# Patient Record
Sex: MALE | Race: WHITE | Employment: OTHER | URBAN - METROPOLITAN AREA
[De-identification: names, ages, dates, MRNs, and addresses within clinical notes are randomized per-mention and may not be internally consistent; named-entity substitution may affect disease eponyms.]

---

## 2017-05-17 RX ORDER — LISINOPRIL 10 MG/1
TABLET ORAL
Qty: 90 TABLET | Refills: 3 | Status: SHIPPED | OUTPATIENT
Start: 2017-05-17 | End: 2017-07-10

## 2017-05-17 NOTE — TELEPHONE ENCOUNTER
Hypertensive Medications; PROTOCOL FAILED. PLEASE ADVISE ON REFILL. THANKS.   Protocol Criteria:  · Appointment scheduled in the past 6 months or in the next 3 months  · BMP or CMP in the past 12 months  · Creatinine result < 2  Recent Visits       Provider

## 2017-07-10 ENCOUNTER — OFFICE VISIT (OUTPATIENT)
Dept: INTERNAL MEDICINE CLINIC | Facility: CLINIC | Age: 57
End: 2017-07-10

## 2017-07-10 ENCOUNTER — LAB ENCOUNTER (OUTPATIENT)
Dept: LAB | Facility: HOSPITAL | Age: 57
End: 2017-07-10
Attending: INTERNAL MEDICINE
Payer: COMMERCIAL

## 2017-07-10 VITALS
TEMPERATURE: 98 F | RESPIRATION RATE: 18 BRPM | DIASTOLIC BLOOD PRESSURE: 86 MMHG | HEART RATE: 76 BPM | WEIGHT: 220.63 LBS | HEIGHT: 69.5 IN | BODY MASS INDEX: 31.94 KG/M2 | SYSTOLIC BLOOD PRESSURE: 110 MMHG

## 2017-07-10 DIAGNOSIS — Z00.00 ROUTINE PHYSICAL EXAMINATION: Primary | ICD-10-CM

## 2017-07-10 DIAGNOSIS — Z00.00 ROUTINE PHYSICAL EXAMINATION: ICD-10-CM

## 2017-07-10 DIAGNOSIS — I10 ESSENTIAL HYPERTENSION WITH GOAL BLOOD PRESSURE LESS THAN 140/90: ICD-10-CM

## 2017-07-10 DIAGNOSIS — M25.512 LEFT SHOULDER PAIN, UNSPECIFIED CHRONICITY: ICD-10-CM

## 2017-07-10 DIAGNOSIS — R73.09 ELEVATED GLUCOSE: ICD-10-CM

## 2017-07-10 LAB
ALBUMIN SERPL BCP-MCNC: 3.9 G/DL (ref 3.5–4.8)
ALBUMIN/GLOB SERPL: 1 {RATIO} (ref 1–2)
ALP SERPL-CCNC: 51 U/L (ref 32–100)
ALT SERPL-CCNC: 41 U/L (ref 17–63)
ANION GAP SERPL CALC-SCNC: 9 MMOL/L (ref 0–18)
AST SERPL-CCNC: 25 U/L (ref 15–41)
BASOPHILS # BLD: 0.1 K/UL (ref 0–0.2)
BASOPHILS NFR BLD: 1 %
BILIRUB SERPL-MCNC: 0.9 MG/DL (ref 0.3–1.2)
BUN SERPL-MCNC: 16 MG/DL (ref 8–20)
BUN/CREAT SERPL: 12.2 (ref 10–20)
CALCIUM SERPL-MCNC: 9 MG/DL (ref 8.5–10.5)
CHLORIDE SERPL-SCNC: 103 MMOL/L (ref 95–110)
CHOLEST SERPL-MCNC: 191 MG/DL (ref 110–200)
CO2 SERPL-SCNC: 26 MMOL/L (ref 22–32)
CREAT SERPL-MCNC: 1.31 MG/DL (ref 0.5–1.5)
EOSINOPHIL # BLD: 0.2 K/UL (ref 0–0.7)
EOSINOPHIL NFR BLD: 3 %
ERYTHROCYTE [DISTWIDTH] IN BLOOD BY AUTOMATED COUNT: 13.5 % (ref 11–15)
GLOBULIN PLAS-MCNC: 3.8 G/DL (ref 2.5–3.7)
GLUCOSE SERPL-MCNC: 112 MG/DL (ref 70–99)
HBA1C MFR BLD: 5.8 % (ref 4–6)
HCT VFR BLD AUTO: 44.2 % (ref 41–52)
HDLC SERPL-MCNC: 44 MG/DL
HGB BLD-MCNC: 15 G/DL (ref 13.5–17.5)
LDLC SERPL CALC-MCNC: 131 MG/DL (ref 0–99)
LYMPHOCYTES # BLD: 1.3 K/UL (ref 1–4)
LYMPHOCYTES NFR BLD: 20 %
MCH RBC QN AUTO: 29.9 PG (ref 27–32)
MCHC RBC AUTO-ENTMCNC: 33.9 G/DL (ref 32–37)
MCV RBC AUTO: 88.3 FL (ref 80–100)
MONOCYTES # BLD: 0.6 K/UL (ref 0–1)
MONOCYTES NFR BLD: 9 %
NEUTROPHILS # BLD AUTO: 4.5 K/UL (ref 1.8–7.7)
NEUTROPHILS NFR BLD: 67 %
NONHDLC SERPL-MCNC: 147 MG/DL
OSMOLALITY UR CALC.SUM OF ELEC: 288 MOSM/KG (ref 275–295)
PLATELET # BLD AUTO: 257 K/UL (ref 140–400)
PMV BLD AUTO: 9 FL (ref 7.4–10.3)
POTASSIUM SERPL-SCNC: 4.3 MMOL/L (ref 3.3–5.1)
PROT SERPL-MCNC: 7.7 G/DL (ref 5.9–8.4)
PSA SERPL-MCNC: 1.9 NG/ML (ref 0–4)
RBC # BLD AUTO: 5.01 M/UL (ref 4.5–5.9)
SODIUM SERPL-SCNC: 138 MMOL/L (ref 136–144)
TRIGL SERPL-MCNC: 78 MG/DL (ref 1–149)
TSH SERPL-ACNC: 0.86 UIU/ML (ref 0.45–5.33)
WBC # BLD AUTO: 6.7 K/UL (ref 4–11)

## 2017-07-10 PROCEDURE — 83036 HEMOGLOBIN GLYCOSYLATED A1C: CPT

## 2017-07-10 PROCEDURE — 85025 COMPLETE CBC W/AUTO DIFF WBC: CPT

## 2017-07-10 PROCEDURE — 80061 LIPID PANEL: CPT

## 2017-07-10 PROCEDURE — 99396 PREV VISIT EST AGE 40-64: CPT | Performed by: INTERNAL MEDICINE

## 2017-07-10 PROCEDURE — 84443 ASSAY THYROID STIM HORMONE: CPT

## 2017-07-10 PROCEDURE — 80053 COMPREHEN METABOLIC PANEL: CPT

## 2017-07-10 PROCEDURE — 36415 COLL VENOUS BLD VENIPUNCTURE: CPT

## 2017-07-10 RX ORDER — LISINOPRIL 10 MG/1
10 TABLET ORAL
Qty: 90 TABLET | Refills: 3 | Status: SHIPPED | OUTPATIENT
Start: 2017-07-10 | End: 2018-08-08

## 2017-07-10 NOTE — PROGRESS NOTES
HPI:    Patient ID: Virginia Flowers is a 55year old male. HPI  Patient is here requesting general physical exam.  He has some active complaints today. Notes some left shoulder pain for the past 3 or 4 months.   May have started when he was doing a lot and fever. HENT: Negative for hearing loss. Eyes: Negative for visual disturbance. Respiratory: Negative for cough and shortness of breath. Cardiovascular: Negative for chest pain and palpitations.    Gastrointestinal: Negative for nausea, vomitin adenopathy. Right: No inguinal adenopathy present. Left: No inguinal adenopathy present. Neurological: He is alert. No cranial nerve deficit. Coordination normal.   Skin: Skin is warm and dry. No rash noted.    Psychiatric: He has a normal m

## 2018-08-09 NOTE — TELEPHONE ENCOUNTER
Hypertensive Medications: Protocol failed, please advise on prescription request.    Protocol Criteria:  · Appointment scheduled in the past 6 months or in the next 3 months  · BMP or CMP in the past 12 months  · Creatinine result < 2  Recent Outpatient Vi

## 2018-08-10 RX ORDER — LISINOPRIL 10 MG/1
TABLET ORAL
Qty: 90 TABLET | Refills: 3 | Status: SHIPPED | OUTPATIENT
Start: 2018-08-10 | End: 2018-08-30

## 2018-08-20 ENCOUNTER — OFFICE VISIT (OUTPATIENT)
Dept: INTERNAL MEDICINE CLINIC | Facility: CLINIC | Age: 58
End: 2018-08-20
Payer: COMMERCIAL

## 2018-08-20 ENCOUNTER — LAB ENCOUNTER (OUTPATIENT)
Dept: LAB | Facility: HOSPITAL | Age: 58
End: 2018-08-20
Attending: INTERNAL MEDICINE
Payer: COMMERCIAL

## 2018-08-20 VITALS
SYSTOLIC BLOOD PRESSURE: 140 MMHG | WEIGHT: 221.81 LBS | BODY MASS INDEX: 30.37 KG/M2 | DIASTOLIC BLOOD PRESSURE: 96 MMHG | HEART RATE: 71 BPM | HEIGHT: 71.5 IN

## 2018-08-20 DIAGNOSIS — Z00.00 ROUTINE PHYSICAL EXAMINATION: Primary | ICD-10-CM

## 2018-08-20 DIAGNOSIS — I10 ESSENTIAL HYPERTENSION WITH GOAL BLOOD PRESSURE LESS THAN 140/90: ICD-10-CM

## 2018-08-20 DIAGNOSIS — R73.09 ELEVATED GLUCOSE: ICD-10-CM

## 2018-08-20 DIAGNOSIS — D23.9 BENIGN NEOPLASM OF SKIN, UNSPECIFIED LOCATION: ICD-10-CM

## 2018-08-20 DIAGNOSIS — Z00.00 ROUTINE PHYSICAL EXAMINATION: ICD-10-CM

## 2018-08-20 LAB
ALBUMIN SERPL BCP-MCNC: 3.8 G/DL (ref 3.5–4.8)
ALBUMIN/GLOB SERPL: 1.1 {RATIO} (ref 1–2)
ALP SERPL-CCNC: 41 U/L (ref 32–100)
ALT SERPL-CCNC: 24 U/L (ref 17–63)
ANION GAP SERPL CALC-SCNC: 6 MMOL/L (ref 0–18)
AST SERPL-CCNC: 22 U/L (ref 15–41)
BASOPHILS # BLD: 0.1 K/UL (ref 0–0.2)
BASOPHILS NFR BLD: 1 %
BILIRUB SERPL-MCNC: 0.6 MG/DL (ref 0.3–1.2)
BUN SERPL-MCNC: 14 MG/DL (ref 8–20)
BUN/CREAT SERPL: 12.7 (ref 10–20)
CALCIUM SERPL-MCNC: 9.4 MG/DL (ref 8.5–10.5)
CHLORIDE SERPL-SCNC: 106 MMOL/L (ref 95–110)
CHOLEST SERPL-MCNC: 176 MG/DL (ref 110–200)
CO2 SERPL-SCNC: 28 MMOL/L (ref 22–32)
CREAT SERPL-MCNC: 1.1 MG/DL (ref 0.5–1.5)
EOSINOPHIL # BLD: 0.2 K/UL (ref 0–0.7)
EOSINOPHIL NFR BLD: 3 %
ERYTHROCYTE [DISTWIDTH] IN BLOOD BY AUTOMATED COUNT: 13.8 % (ref 11–15)
GLOBULIN PLAS-MCNC: 3.6 G/DL (ref 2.5–3.7)
GLUCOSE SERPL-MCNC: 113 MG/DL (ref 70–99)
HBA1C MFR BLD: 5.9 % (ref 4–6)
HCT VFR BLD AUTO: 43.6 % (ref 41–52)
HDLC SERPL-MCNC: 49 MG/DL
HGB BLD-MCNC: 15.1 G/DL (ref 13.5–17.5)
LDLC SERPL CALC-MCNC: 112 MG/DL (ref 0–99)
LYMPHOCYTES # BLD: 1.5 K/UL (ref 1–4)
LYMPHOCYTES NFR BLD: 23 %
MCH RBC QN AUTO: 30.3 PG (ref 27–32)
MCHC RBC AUTO-ENTMCNC: 34.5 G/DL (ref 32–37)
MCV RBC AUTO: 87.7 FL (ref 80–100)
MONOCYTES # BLD: 0.7 K/UL (ref 0–1)
MONOCYTES NFR BLD: 10 %
NEUTROPHILS # BLD AUTO: 3.9 K/UL (ref 1.8–7.7)
NEUTROPHILS NFR BLD: 63 %
NONHDLC SERPL-MCNC: 127 MG/DL
OSMOLALITY UR CALC.SUM OF ELEC: 291 MOSM/KG (ref 275–295)
PATIENT FASTING: YES
PLATELET # BLD AUTO: 264 K/UL (ref 140–400)
PMV BLD AUTO: 9.1 FL (ref 7.4–10.3)
POTASSIUM SERPL-SCNC: 4.8 MMOL/L (ref 3.3–5.1)
PROT SERPL-MCNC: 7.4 G/DL (ref 5.9–8.4)
PSA SERPL-MCNC: 2.4 NG/ML (ref 0–4)
RBC # BLD AUTO: 4.98 M/UL (ref 4.5–5.9)
SODIUM SERPL-SCNC: 140 MMOL/L (ref 136–144)
TRIGL SERPL-MCNC: 76 MG/DL (ref 1–149)
TSH SERPL-ACNC: 0.95 UIU/ML (ref 0.45–5.33)
VIT B12 SERPL-MCNC: 292 PG/ML (ref 181–914)
WBC # BLD AUTO: 6.3 K/UL (ref 4–11)

## 2018-08-20 PROCEDURE — 99396 PREV VISIT EST AGE 40-64: CPT | Performed by: INTERNAL MEDICINE

## 2018-08-20 PROCEDURE — 83036 HEMOGLOBIN GLYCOSYLATED A1C: CPT

## 2018-08-20 PROCEDURE — 80053 COMPREHEN METABOLIC PANEL: CPT

## 2018-08-20 PROCEDURE — 84443 ASSAY THYROID STIM HORMONE: CPT

## 2018-08-20 PROCEDURE — 85025 COMPLETE CBC W/AUTO DIFF WBC: CPT

## 2018-08-20 PROCEDURE — 82607 VITAMIN B-12: CPT

## 2018-08-20 PROCEDURE — 80061 LIPID PANEL: CPT

## 2018-08-20 PROCEDURE — 36415 COLL VENOUS BLD VENIPUNCTURE: CPT

## 2018-08-20 NOTE — PROGRESS NOTES
HPI:    Patient ID: Reshma Luo is a 65year old male. HPI  Patient is here requesting physical exam and follow-up on hypertension. Last seen here about 1 year ago.   Full blood work done at that time showed mild elevation of blood sugar 117 with a Eyes: Negative for visual disturbance. Respiratory: Negative for cough and shortness of breath. Cardiovascular: Negative for chest pain and palpitations. Gastrointestinal: Negative for nausea, vomiting, abdominal pain, diarrhea and constipation. inguinal adenopathy present. Left: No inguinal adenopathy present. Neurological: He is alert. No cranial nerve deficit. Coordination normal.   Skin: Skin is warm and dry. No rash noted. Psychiatric: He has a normal mood and affect.  His behavior

## 2018-08-30 ENCOUNTER — OFFICE VISIT (OUTPATIENT)
Dept: INTERNAL MEDICINE CLINIC | Facility: CLINIC | Age: 58
End: 2018-08-30
Payer: COMMERCIAL

## 2018-08-30 VITALS
DIASTOLIC BLOOD PRESSURE: 86 MMHG | TEMPERATURE: 98 F | SYSTOLIC BLOOD PRESSURE: 128 MMHG | BODY MASS INDEX: 30.13 KG/M2 | HEART RATE: 74 BPM | WEIGHT: 220 LBS | RESPIRATION RATE: 18 BRPM | HEIGHT: 71.5 IN

## 2018-08-30 DIAGNOSIS — I10 ESSENTIAL HYPERTENSION WITH GOAL BLOOD PRESSURE LESS THAN 140/90: Primary | ICD-10-CM

## 2018-08-30 PROCEDURE — 99212 OFFICE O/P EST SF 10 MIN: CPT | Performed by: INTERNAL MEDICINE

## 2018-08-30 RX ORDER — LISINOPRIL 10 MG/1
10 TABLET ORAL
Qty: 90 TABLET | Refills: 3 | Status: SHIPPED | OUTPATIENT
Start: 2018-08-30 | End: 2019-09-10

## 2018-09-03 NOTE — PROGRESS NOTES
HPI:    Patient ID: Alistair Alonzo is a 65year old male. HPI  Patient is here for follow-up on hypertension. Last seen here on August 20 for general physical exam.  Blood pressure was somewhat elevated at that time. Blood work was performed.   Farrah Keep working on diet and exercise. Follow-up in 6 months. Meds This Visit:  Signed Prescriptions Disp Refills    lisinopril 10 MG Oral Tab 90 tablet 3      Sig: Take 1 tablet (10 mg total) by mouth once daily.            Imaging & Referrals:  None

## 2019-01-22 ENCOUNTER — OFFICE VISIT (OUTPATIENT)
Dept: INTERNAL MEDICINE CLINIC | Facility: CLINIC | Age: 59
End: 2019-01-22
Payer: COMMERCIAL

## 2019-01-22 VITALS
HEART RATE: 80 BPM | SYSTOLIC BLOOD PRESSURE: 133 MMHG | TEMPERATURE: 99 F | DIASTOLIC BLOOD PRESSURE: 84 MMHG | HEIGHT: 71 IN | RESPIRATION RATE: 20 BRPM | WEIGHT: 227.63 LBS | BODY MASS INDEX: 31.87 KG/M2

## 2019-01-22 DIAGNOSIS — J32.9 SINUSITIS, UNSPECIFIED CHRONICITY, UNSPECIFIED LOCATION: Primary | ICD-10-CM

## 2019-01-22 PROCEDURE — 99212 OFFICE O/P EST SF 10 MIN: CPT | Performed by: INTERNAL MEDICINE

## 2019-01-22 PROCEDURE — 99213 OFFICE O/P EST LOW 20 MIN: CPT | Performed by: INTERNAL MEDICINE

## 2019-01-22 RX ORDER — LISINOPRIL 10 MG/1
TABLET ORAL
COMMUNITY
Start: 2015-02-07 | End: 2019-01-22

## 2019-01-22 RX ORDER — AMOXICILLIN 875 MG/1
875 TABLET, COATED ORAL 2 TIMES DAILY
Qty: 14 TABLET | Refills: 0 | Status: SHIPPED | OUTPATIENT
Start: 2019-01-22 | End: 2019-01-29

## 2019-01-22 NOTE — PROGRESS NOTES
HPI:    Patient ID: Victor Hugo Vogt is a 63year old male. HPI  Patient here with possible sinus infection. For for 5 days he had some pain in the upper teeth and gums. Also night sweats and chills. No visible inflammation around the gums.   He did no

## 2019-02-28 ENCOUNTER — OFFICE VISIT (OUTPATIENT)
Dept: OPHTHALMOLOGY | Facility: CLINIC | Age: 59
End: 2019-02-28
Payer: COMMERCIAL

## 2019-02-28 DIAGNOSIS — H02.401 PTOSIS, RIGHT EYELID: Primary | ICD-10-CM

## 2019-02-28 PROCEDURE — 99212 OFFICE O/P EST SF 10 MIN: CPT | Performed by: OPHTHALMOLOGY

## 2019-02-28 PROCEDURE — 99202 OFFICE O/P NEW SF 15 MIN: CPT | Performed by: OPHTHALMOLOGY

## 2019-02-28 NOTE — PROGRESS NOTES
Kannan Barry is a 63year old male. HPI:     HPI     New patient here for a ptosis evaluation. Patient complains of drooping of RUL for many years and it is gradually getting worse.   Patient would like to discuss options and get a recommendation for Full          Extraocular Movement       Right Left     Full, Ortho Full, Ortho            Slit Lamp and Fundus Exam     External Exam       Right Left    Palpebral fissure 9 mm 10 mm    MRD1 1.0 mm 4.0 mm    Levator 15 mm 15 mm    Lid crease 5 mm 4 mm

## 2019-02-28 NOTE — ASSESSMENT & PLAN NOTE
Discussed diagnosis and treatment in detail with patient. Recommend either Dr. Abiola Chau or Dr. Kesha Robles if he is interested in surgery. Information given.

## 2019-02-28 NOTE — PATIENT INSTRUCTIONS
Ptosis, right eyelid  Discussed diagnosis and treatment in detail with patient. Recommend either Dr. Deanna Younger or Dr. Inge Huang if he is interested in surgery. Information given.

## 2019-06-10 ENCOUNTER — TELEPHONE (OUTPATIENT)
Dept: OPHTHALMOLOGY | Facility: CLINIC | Age: 59
End: 2019-06-10

## 2019-06-10 NOTE — TELEPHONE ENCOUNTER
Re New dx - pt has occular  hypertension - needs evaluation  - OV notes from todays visit are being sent over now

## 2019-06-10 NOTE — TELEPHONE ENCOUNTER
Patient was seen by Dr. Kee Lopez for ptosis evaluation and was found to have [address] OU checked by Dr. Kee Lopez (27/24)  He was told to follow up with TERELL for a complete exam and glaucoma work up if necessary.   Appointment was booked 6/25/19 at 3:45 with TERELL.andrea

## 2019-06-25 ENCOUNTER — OFFICE VISIT (OUTPATIENT)
Dept: OPHTHALMOLOGY | Facility: CLINIC | Age: 59
End: 2019-06-25
Payer: COMMERCIAL

## 2019-06-25 DIAGNOSIS — H40.053 OCULAR HYPERTENSION, BILATERAL: ICD-10-CM

## 2019-06-25 DIAGNOSIS — H02.401 PTOSIS, RIGHT EYELID: Primary | ICD-10-CM

## 2019-06-25 PROCEDURE — 92014 COMPRE OPH EXAM EST PT 1/>: CPT | Performed by: OPHTHALMOLOGY

## 2019-06-25 NOTE — ASSESSMENT & PLAN NOTE
Patient had 1 episode of ocular hypertension with Dr. Cheryl Torres. Today's pressure is normal in each eye 19/20. Optic nerves look very healthy and normal and do not look glaucomatous. There is no sign of glaucoma in this patient at this time.   Will follow

## 2019-06-25 NOTE — PROGRESS NOTES
Pavithra Walters is a 63year old male. HPI:     HPI     Pt is here for complete exam. Pt was seen by Dr. Jeanna Torrez for ptosis surgery evaluation OD on  6/10/19. Dr Jeanna Torrez noticed kelly IOP (OD 27, OS 24) when he was there.  Pt denies any family history of gla - Linear)       Right Left    Dist cc 20/25 -2 20/20    Near sc 20/20 20/20    Correction:  Glasses          Tonometry (Applanation, 3:59 PM)       Right Left    Pressure 19 20          Pupils       Pupils    Right PERRL    Left PERRL          Visual Field encounter        Follow up instructions:  Return in about 2 years (around 6/25/2021) for Complete eye exam.    6/25/2019  Scribed by: Braxton Millrad MD

## 2019-06-25 NOTE — PATIENT INSTRUCTIONS
Ptosis, right eyelid  Continue to follow up with Dr. Wilton Cruz for ptosis repair. Ocular hypertension, bilateral  Patient had 1 episode of ocular hypertension with Dr. Wilton Cruz. Today's pressure is normal in each eye 19/20.   Optic nerves look very healthy an

## 2019-09-12 RX ORDER — LISINOPRIL 10 MG/1
TABLET ORAL
Qty: 90 TABLET | Refills: 3 | Status: SHIPPED | OUTPATIENT
Start: 2019-09-12 | End: 2019-09-30

## 2019-09-12 NOTE — TELEPHONE ENCOUNTER
Please review; protocol failed.  D/t labs  Requested Prescriptions     Pending Prescriptions Disp Refills   • LISINOPRIL 10 MG Oral Tab [Pharmacy Med Name: LISINOPRIL 10MG TAB] 90 tablet 3     Sig: TAKE 1 TABLET BY MOUTH DAILY         Recent Visits  Date Ty

## 2019-09-30 ENCOUNTER — OFFICE VISIT (OUTPATIENT)
Dept: INTERNAL MEDICINE CLINIC | Facility: CLINIC | Age: 59
End: 2019-09-30
Payer: COMMERCIAL

## 2019-09-30 VITALS
TEMPERATURE: 99 F | DIASTOLIC BLOOD PRESSURE: 84 MMHG | WEIGHT: 225.31 LBS | SYSTOLIC BLOOD PRESSURE: 120 MMHG | HEIGHT: 71 IN | RESPIRATION RATE: 20 BRPM | HEART RATE: 74 BPM | BODY MASS INDEX: 31.54 KG/M2

## 2019-09-30 DIAGNOSIS — I10 ESSENTIAL HYPERTENSION WITH GOAL BLOOD PRESSURE LESS THAN 140/90: Primary | ICD-10-CM

## 2019-09-30 PROCEDURE — 99213 OFFICE O/P EST LOW 20 MIN: CPT | Performed by: INTERNAL MEDICINE

## 2019-09-30 RX ORDER — LISINOPRIL 10 MG/1
10 TABLET ORAL
Qty: 90 TABLET | Refills: 3 | Status: SHIPPED | OUTPATIENT
Start: 2019-09-30 | End: 2020-09-28

## 2019-09-30 NOTE — PROGRESS NOTES
HPI:    Patient ID: Kee Solano is a 63year old male. HPI  Patient is here for follow-up on hypertension. Last seen here in January for sinus issues. Also with eye problems. He had his ptosis procedure and the eyelids are better now.   He had blo pain.   Skin: Negative for rash. Neurological: Negative for weakness, numbness and headaches. Hematological: Does not bruise/bleed easily. Psychiatric/Behavioral: Negative for depressed mood. The patient is not nervous/anxious.              Current Ou medications. Work on diet, exercise, weight loss.   Follow-up in 4 to 6 months for physical exam.  At the very end of the visit patient brought up an issue with possible injury to his right posterior thigh that occurred a few months ago when he was awkward

## 2020-02-04 ENCOUNTER — OFFICE VISIT (OUTPATIENT)
Dept: FAMILY MEDICINE CLINIC | Facility: CLINIC | Age: 60
End: 2020-02-04
Payer: COMMERCIAL

## 2020-02-04 DIAGNOSIS — Z11.1 SCREENING EXAMINATION FOR PULMONARY TUBERCULOSIS: Primary | ICD-10-CM

## 2020-02-04 NOTE — PROGRESS NOTES
Pavithra Walters is a 63year old male who presents for TB testing. TUBERCULOSIS SCREENING QUESTIONNAIRE    · Live vaccines in the past month? no  · Any steroid medication in the past month? no  · History of BCG vaccine?     no  · If female, are you c

## 2020-02-04 NOTE — PATIENT INSTRUCTIONS
You will need to return to clinic in 48-72 hours to have results of TB test read. Please return to clinic on 2/6/20 after 9:30 AM or on 2/7/20 before 9:30 AM  to have your TB test read.     If you do not return during this time your test will need to be

## 2020-02-06 ENCOUNTER — NURSE ONLY (OUTPATIENT)
Dept: FAMILY MEDICINE CLINIC | Facility: CLINIC | Age: 60
End: 2020-02-06
Payer: COMMERCIAL

## 2020-02-06 LAB — INDURATION (): 0 MM (ref 0–11)

## 2020-02-06 NOTE — PATIENT INSTRUCTIONS
02/06/20    Andrew Aparicio  10/3/1960    This document is to verify Andrew Aparicio had a TB skin test preformed and the results were: NEGATIVE 0.0mm    Date given: 2/4/20  Date read: 02/06/20        Please call the number listed above if you have fur

## 2020-07-07 ENCOUNTER — HOSPITAL ENCOUNTER (OUTPATIENT)
Dept: CT IMAGING | Age: 60
Discharge: HOME | End: 2020-07-07
Attending: INTERNAL MEDICINE

## 2020-07-07 DIAGNOSIS — Z13.9 ENCOUNTER FOR SCREENING: ICD-10-CM

## 2020-08-17 ENCOUNTER — HOSPITAL ENCOUNTER (OUTPATIENT)
Dept: ULTRASOUND IMAGING | Facility: HOSPITAL | Age: 60
Discharge: HOME | End: 2020-08-17
Attending: INTERNAL MEDICINE

## 2020-08-17 DIAGNOSIS — Z13.9 ENCOUNTER FOR SCREENING: ICD-10-CM

## 2020-09-22 ENCOUNTER — IMMUNIZATION (OUTPATIENT)
Dept: FAMILY MEDICINE CLINIC | Facility: CLINIC | Age: 60
End: 2020-09-22
Payer: COMMERCIAL

## 2020-09-22 PROCEDURE — 90686 IIV4 VACC NO PRSV 0.5 ML IM: CPT | Performed by: NURSE PRACTITIONER

## 2020-09-22 PROCEDURE — 90471 IMMUNIZATION ADMIN: CPT | Performed by: NURSE PRACTITIONER

## 2020-09-28 ENCOUNTER — OFFICE VISIT (OUTPATIENT)
Dept: INTERNAL MEDICINE CLINIC | Facility: CLINIC | Age: 60
End: 2020-09-28
Payer: COMMERCIAL

## 2020-09-28 VITALS
RESPIRATION RATE: 20 BRPM | HEART RATE: 76 BPM | BODY MASS INDEX: 31.8 KG/M2 | WEIGHT: 227.13 LBS | DIASTOLIC BLOOD PRESSURE: 78 MMHG | HEIGHT: 71 IN | SYSTOLIC BLOOD PRESSURE: 118 MMHG

## 2020-09-28 DIAGNOSIS — R91.1 PULMONARY NODULE: ICD-10-CM

## 2020-09-28 DIAGNOSIS — I10 ESSENTIAL HYPERTENSION WITH GOAL BLOOD PRESSURE LESS THAN 140/90: ICD-10-CM

## 2020-09-28 DIAGNOSIS — Z23 NEED FOR VACCINATION: ICD-10-CM

## 2020-09-28 DIAGNOSIS — Z00.00 ROUTINE PHYSICAL EXAMINATION: Primary | ICD-10-CM

## 2020-09-28 DIAGNOSIS — R73.09 ELEVATED GLUCOSE: ICD-10-CM

## 2020-09-28 PROCEDURE — 99396 PREV VISIT EST AGE 40-64: CPT | Performed by: INTERNAL MEDICINE

## 2020-09-28 PROCEDURE — 3008F BODY MASS INDEX DOCD: CPT | Performed by: INTERNAL MEDICINE

## 2020-09-28 PROCEDURE — 90750 HZV VACC RECOMBINANT IM: CPT | Performed by: INTERNAL MEDICINE

## 2020-09-28 PROCEDURE — 3078F DIAST BP <80 MM HG: CPT | Performed by: INTERNAL MEDICINE

## 2020-09-28 PROCEDURE — 90471 IMMUNIZATION ADMIN: CPT | Performed by: INTERNAL MEDICINE

## 2020-09-28 PROCEDURE — 3074F SYST BP LT 130 MM HG: CPT | Performed by: INTERNAL MEDICINE

## 2020-09-28 RX ORDER — LISINOPRIL 10 MG/1
10 TABLET ORAL
Qty: 90 TABLET | Refills: 3 | Status: SHIPPED | OUTPATIENT
Start: 2020-09-28 | End: 2021-09-29

## 2020-09-28 NOTE — PROGRESS NOTES
Normal HPI:    Patient ID: Pavithra Walters is a 63year old male. HPI  Patient is here requesting physical exam.  Last seen here 1 year ago. Underlying hypertension as well as some mild elevation of blood sugar. No diagnosis of diabetes.   Full blood w Comment: beer & wine (2 drinks) weekly    Drug use: No         Review of Systems         Current Outpatient Medications   Medication Sig Dispense Refill   • lisinopril 10 MG Oral Tab Take 1 tablet (10 mg total) by mouth once daily.  90 tablet 3   • Omeprazo Last 6 Encounters:  09/28/20 : 227 lb 1.6 oz (103 kg)  09/30/19 : 225 lb 4.8 oz (102.2 kg)  01/22/19 : 227 lb 9.6 oz (103.2 kg)  08/30/18 : 220 lb (99.8 kg)  08/20/18 : 221 lb 12.8 oz (100.6 kg)  07/10/17 : 220 lb 9.6 oz (100.1 kg)            ASSESSMENT/PL

## 2020-09-29 ENCOUNTER — LAB ENCOUNTER (OUTPATIENT)
Dept: LAB | Facility: HOSPITAL | Age: 60
End: 2020-09-29
Attending: INTERNAL MEDICINE
Payer: COMMERCIAL

## 2020-09-29 DIAGNOSIS — Z00.00 ROUTINE PHYSICAL EXAMINATION: ICD-10-CM

## 2020-09-29 PROCEDURE — 80061 LIPID PANEL: CPT

## 2020-09-29 PROCEDURE — 36415 COLL VENOUS BLD VENIPUNCTURE: CPT

## 2020-09-29 PROCEDURE — 80053 COMPREHEN METABOLIC PANEL: CPT

## 2020-09-29 PROCEDURE — 84443 ASSAY THYROID STIM HORMONE: CPT

## 2020-09-29 PROCEDURE — 85025 COMPLETE CBC W/AUTO DIFF WBC: CPT

## 2020-09-29 PROCEDURE — 83036 HEMOGLOBIN GLYCOSYLATED A1C: CPT

## 2020-10-13 ENCOUNTER — HOSPITAL ENCOUNTER (EMERGENCY)
Facility: HOSPITAL | Age: 60
Discharge: HOME | End: 2020-10-13
Attending: EMERGENCY MEDICINE
Payer: COMMERCIAL

## 2020-10-13 ENCOUNTER — HOSPITAL ENCOUNTER (OUTPATIENT)
Dept: CT IMAGING | Facility: HOSPITAL | Age: 60
Discharge: HOME | End: 2020-10-13
Attending: INTERNAL MEDICINE
Payer: COMMERCIAL

## 2020-10-13 ENCOUNTER — TELEPHONE (OUTPATIENT)
Dept: PULMONOLOGY | Facility: CLINIC | Age: 60
End: 2020-10-13

## 2020-10-13 ENCOUNTER — TELEPHONE (OUTPATIENT)
Dept: INTERNAL MEDICINE CLINIC | Facility: CLINIC | Age: 60
End: 2020-10-13

## 2020-10-13 VITALS
DIASTOLIC BLOOD PRESSURE: 101 MMHG | HEIGHT: 70 IN | OXYGEN SATURATION: 97 % | HEART RATE: 63 BPM | RESPIRATION RATE: 19 BRPM | TEMPERATURE: 98 F | SYSTOLIC BLOOD PRESSURE: 142 MMHG | BODY MASS INDEX: 32.21 KG/M2 | WEIGHT: 225 LBS

## 2020-10-13 DIAGNOSIS — I26.99 PULMONARY EMBOLISM WITHOUT ACUTE COR PULMONALE, UNSPECIFIED CHRONICITY, UNSPECIFIED PULMONARY EMBOLISM TYPE (HCC): Primary | ICD-10-CM

## 2020-10-13 DIAGNOSIS — R91.1 PULMONARY NODULE: ICD-10-CM

## 2020-10-13 PROCEDURE — 99284 EMERGENCY DEPT VISIT MOD MDM: CPT

## 2020-10-13 PROCEDURE — 93005 ELECTROCARDIOGRAM TRACING: CPT

## 2020-10-13 PROCEDURE — 85025 COMPLETE CBC W/AUTO DIFF WBC: CPT | Performed by: EMERGENCY MEDICINE

## 2020-10-13 PROCEDURE — 80048 BASIC METABOLIC PNL TOTAL CA: CPT | Performed by: EMERGENCY MEDICINE

## 2020-10-13 PROCEDURE — 93010 ELECTROCARDIOGRAM REPORT: CPT | Performed by: EMERGENCY MEDICINE

## 2020-10-13 PROCEDURE — 71260 CT THORAX DX C+: CPT | Performed by: INTERNAL MEDICINE

## 2020-10-13 PROCEDURE — 36415 COLL VENOUS BLD VENIPUNCTURE: CPT

## 2020-10-13 NOTE — TELEPHONE ENCOUNTER
Dr. Farhana Carvalho radiologist called:patient had CT scan today. Incidentally finding of RT lung pulmonary embolism. He can be reached at [phone]. Contacted Dr. David Tian to inform him and call Dr Esdras Otoole to discuss plan of care.

## 2020-10-13 NOTE — ED PROVIDER NOTES
Patient Seen in: [facility] AND [facility] Emergency Department      History   Patient presents with:  Abnormal Result    Stated Complaint: + PE ON CT FINDINGS    HPI    41-year-old male with history of hypertension presents with complaints of pulmonary embolism Temp 98.2 °F (36.8 °C)   Temp src Oral   SpO2 98 %   O2 Device None (Room air)       Current:BP (!) 140/97   Pulse 71   Temp 98.2 °F (36.8 °C) (Oral)   Resp 20   Ht 177.8 cm (5' 10\")   Wt 102.1 kg   SpO2 97%   BMI 32.28 kg/m²         Physical Exam patient be put on Xarelto 15 mg twice daily and follow-up in the pulmonology clinic on Thursday with the physician assistant.   I discussed with the patient the risks and benefits of anticoagulation therapy and the need to return if he becomes symptomatic f

## 2020-10-13 NOTE — TELEPHONE ENCOUNTER
Spoke with patient. Informed patient of sooner appointment, patient available and agreeable. Appointment scheduled for 10/15/20 at 1 PM. Verified date, time, location and where to park. Patient verbalized understanding.     Erum Andino

## 2020-10-15 ENCOUNTER — OFFICE VISIT (OUTPATIENT)
Dept: PULMONOLOGY | Facility: CLINIC | Age: 60
End: 2020-10-15
Payer: COMMERCIAL

## 2020-10-15 VITALS
DIASTOLIC BLOOD PRESSURE: 73 MMHG | SYSTOLIC BLOOD PRESSURE: 113 MMHG | HEIGHT: 70 IN | WEIGHT: 228 LBS | TEMPERATURE: 97 F | BODY MASS INDEX: 32.64 KG/M2 | HEART RATE: 71 BPM

## 2020-10-15 DIAGNOSIS — I26.93 SINGLE SUBSEGMENTAL PULMONARY EMBOLISM WITHOUT ACUTE COR PULMONALE (HCC): Primary | ICD-10-CM

## 2020-10-15 PROCEDURE — 3078F DIAST BP <80 MM HG: CPT | Performed by: PHYSICIAN ASSISTANT

## 2020-10-15 PROCEDURE — 3074F SYST BP LT 130 MM HG: CPT | Performed by: PHYSICIAN ASSISTANT

## 2020-10-15 PROCEDURE — 3008F BODY MASS INDEX DOCD: CPT | Performed by: PHYSICIAN ASSISTANT

## 2020-10-15 PROCEDURE — 99244 OFF/OP CNSLTJ NEW/EST MOD 40: CPT | Performed by: PHYSICIAN ASSISTANT

## 2020-10-15 NOTE — PROGRESS NOTES
Pulmonary Consult Note    History of Present Illness:  Robert Kate is a 62year old male presenting to pulmonary clinic today for pulmonary embolism.  The patient had CT coronary calcium score July 2020 for screening which identified a 6 mm subpleural pul Physical Exam:  /73 (BP Location: Left arm, Patient Position: Sitting, Cuff Size: large)   Pulse 71   Temp 97.3 °F (36.3 °C) (Temporal)   Ht 5' 10\" (1.778 m)   Wt 228 lb (103.4 kg)   BMI 32.71 kg/m²    Constitutional: A&Ox3. No acute distress.  P anticoagulation  -Check bilateral LE US  -Follow-up at the 3 month interval or sooner if needed    # Pulmonary nodules  -Multiple small pulmonary nodules measuring 5 mm or less  -No smoking hx  -According to [place] for incidentally d

## 2020-10-15 NOTE — PATIENT INSTRUCTIONS
Please finish the Xarelto 15 mg twice daily which is a 3 week course. You will then start Xarelto 20 mg once daily. I sent this into your pharmacy. Please call if you have any issues picking up the new prescription. You will continue Xarelto for 6 months.

## 2020-11-09 ENCOUNTER — HOSPITAL ENCOUNTER (OUTPATIENT)
Dept: ULTRASOUND IMAGING | Facility: HOSPITAL | Age: 60
Discharge: HOME | End: 2020-11-09
Attending: PHYSICIAN ASSISTANT
Payer: COMMERCIAL

## 2020-11-09 DIAGNOSIS — I26.93 SINGLE SUBSEGMENTAL PULMONARY EMBOLISM WITHOUT ACUTE COR PULMONALE (HCC): ICD-10-CM

## 2020-11-09 PROCEDURE — 93970 EXTREMITY STUDY: CPT | Performed by: PHYSICIAN ASSISTANT

## 2021-01-06 ENCOUNTER — NURSE ONLY (OUTPATIENT)
Dept: INTERNAL MEDICINE CLINIC | Facility: CLINIC | Age: 61
End: 2021-01-06
Payer: COMMERCIAL

## 2021-01-06 DIAGNOSIS — Z23 IMMUNIZATION DUE: Primary | ICD-10-CM

## 2021-01-06 PROCEDURE — 90750 HZV VACC RECOMBINANT IM: CPT | Performed by: INTERNAL MEDICINE

## 2021-01-06 PROCEDURE — 90471 IMMUNIZATION ADMIN: CPT | Performed by: INTERNAL MEDICINE

## 2021-01-06 NOTE — PROGRESS NOTES
Patient is here today for their second Shingrix injection. First injection was given on 20. Patient verbalized name and . Patient responded well to injection given on left deltoid via IM, no reaction noted. VIS was given.

## 2021-01-27 NOTE — TELEPHONE ENCOUNTER
Current Outpatient Medications   Medication Sig Dispense Refill   • Rivaroxaban (XARELTO) 20 MG Oral Tab Take 1 tablet (20 mg total) by mouth daily with food.  30 tablet 2

## 2021-01-27 NOTE — TELEPHONE ENCOUNTER
Last seen 10-   Last refill 10-   Routed to [facility] RHONDA for sign off

## 2021-02-02 ENCOUNTER — OFFICE VISIT (OUTPATIENT)
Dept: FAMILY MEDICINE CLINIC | Facility: CLINIC | Age: 61
End: 2021-02-02
Payer: COMMERCIAL

## 2021-02-02 DIAGNOSIS — Z11.1 SCREENING FOR TUBERCULOSIS: Primary | ICD-10-CM

## 2021-02-02 PROCEDURE — 86580 TB INTRADERMAL TEST: CPT | Performed by: NURSE PRACTITIONER

## 2021-02-02 NOTE — PATIENT INSTRUCTIONS
You will need to return to clinic in 48-72 hours to have results of TB test read. Please return to clinic on [address] 2/4/21 after 1500 or on Fri 2/4/21 before 1500 to have your TB test read.     If you do not return during this time your test will need to

## 2021-02-02 NOTE — PROGRESS NOTES
Cecy Person is a 62year old male who presents for TB testing. TUBERCULOSIS SCREENING QUESTIONNAIRE    · Live vaccines in the past month? no  · Any steroid medication in the past month? no  · History of BCG vaccine?    no  · If female, are you cur

## 2021-02-04 ENCOUNTER — OFFICE VISIT (OUTPATIENT)
Dept: FAMILY MEDICINE CLINIC | Facility: CLINIC | Age: 61
End: 2021-02-04
Payer: COMMERCIAL

## 2021-02-04 DIAGNOSIS — Z11.1 ENCOUNTER FOR PPD SKIN TEST READING: Primary | ICD-10-CM

## 2021-02-04 LAB — INDURATION (): 0 MM (ref 0–11)

## 2021-02-04 NOTE — PROGRESS NOTES
Pt here for tb reading.     Recent Results (from the past 24 hour(s))   TB INTRADERMAL TEST    Collection Time: 02/04/21  3:14 PM   Result Value Ref Range    Date Given: 2/2/21     Site: right forearm     INDURATION (PPD) 0.0 0.0 - 11 mm

## 2021-03-12 DIAGNOSIS — Z23 NEED FOR VACCINATION: ICD-10-CM

## 2021-05-13 NOTE — TELEPHONE ENCOUNTER
Refill request    Current Outpatient Medications   Medication Sig Dispense Refill   • Rivaroxaban (XARELTO) 20 MG Oral Tab Take 1 tablet (20 mg total) by mouth daily with food.  30 tablet 2

## 2021-05-17 NOTE — TELEPHONE ENCOUNTER
Refill not appropriate as patient has completed full treatment dose of Xarelto for PE dx Oct 2020. I spoke with the patient regarding above. Recommend lifelong baby aspirin.

## 2021-09-22 ENCOUNTER — IMMUNIZATION (OUTPATIENT)
Dept: FAMILY MEDICINE CLINIC | Facility: CLINIC | Age: 61
End: 2021-09-22
Payer: COMMERCIAL

## 2021-09-22 DIAGNOSIS — Z23 NEED FOR INFLUENZA VACCINATION: Primary | ICD-10-CM

## 2021-09-22 PROCEDURE — 90686 IIV4 VACC NO PRSV 0.5 ML IM: CPT | Performed by: PHYSICIAN ASSISTANT

## 2021-09-22 PROCEDURE — 90471 IMMUNIZATION ADMIN: CPT | Performed by: PHYSICIAN ASSISTANT

## 2021-09-29 RX ORDER — LISINOPRIL 10 MG/1
TABLET ORAL
Qty: 90 TABLET | Refills: 2 | Status: SHIPPED | OUTPATIENT
Start: 2021-09-29

## 2021-10-04 ENCOUNTER — OFFICE VISIT (OUTPATIENT)
Dept: INTERNAL MEDICINE CLINIC | Facility: CLINIC | Age: 61
End: 2021-10-04
Payer: COMMERCIAL

## 2021-10-04 VITALS
BODY MASS INDEX: 31.21 KG/M2 | TEMPERATURE: 99 F | HEART RATE: 74 BPM | RESPIRATION RATE: 20 BRPM | SYSTOLIC BLOOD PRESSURE: 114 MMHG | DIASTOLIC BLOOD PRESSURE: 78 MMHG | HEIGHT: 70 IN | WEIGHT: 218 LBS

## 2021-10-04 DIAGNOSIS — R91.1 PULMONARY NODULE: ICD-10-CM

## 2021-10-04 DIAGNOSIS — R73.09 ELEVATED GLUCOSE: ICD-10-CM

## 2021-10-04 DIAGNOSIS — Z86.711 HISTORY OF PULMONARY EMBOLISM: ICD-10-CM

## 2021-10-04 DIAGNOSIS — I10 ESSENTIAL HYPERTENSION WITH GOAL BLOOD PRESSURE LESS THAN 140/90: ICD-10-CM

## 2021-10-04 DIAGNOSIS — Z00.00 ROUTINE PHYSICAL EXAMINATION: Primary | ICD-10-CM

## 2021-10-04 PROCEDURE — 99396 PREV VISIT EST AGE 40-64: CPT | Performed by: INTERNAL MEDICINE

## 2021-10-04 PROCEDURE — 3074F SYST BP LT 130 MM HG: CPT | Performed by: INTERNAL MEDICINE

## 2021-10-04 PROCEDURE — 3078F DIAST BP <80 MM HG: CPT | Performed by: INTERNAL MEDICINE

## 2021-10-04 PROCEDURE — 3008F BODY MASS INDEX DOCD: CPT | Performed by: INTERNAL MEDICINE

## 2021-10-04 RX ORDER — ASPIRIN 81 MG/1
81 TABLET ORAL DAILY
COMMUNITY

## 2021-10-04 NOTE — PROGRESS NOTES
HPI:    Patient ID: Sugey De La Cruz is a 62year old male. HPI  Patient is here requesting a physical exam and follow-up on chronic medical problems. Last seen here about a year ago.   At that time he had a cardiac calcium scan which revealed an inciden Alcohol use: Yes      Comment: beer & wine (2 drinks) weekly    Drug use: No         Review of Systems          Current Outpatient Medications   Medication Sig Dispense Refill   • LISINOPRIL 10 MG Oral Tab TAKE 1 TABLET (10MG) BY MOUTH ONCE DAILY.  90 table alert. Mental status is at baseline. Cranial Nerves: No cranial nerve deficit. Coordination: Coordination normal.   Psychiatric:         Mood and Affect: Mood normal.         Behavior: Behavior normal.         Thought Content:  Thought content nor

## 2021-10-05 ENCOUNTER — LAB ENCOUNTER (OUTPATIENT)
Dept: LAB | Facility: HOSPITAL | Age: 61
End: 2021-10-05
Attending: INTERNAL MEDICINE
Payer: COMMERCIAL

## 2021-10-05 DIAGNOSIS — Z00.00 ROUTINE PHYSICAL EXAMINATION: ICD-10-CM

## 2021-10-05 PROCEDURE — 80053 COMPREHEN METABOLIC PANEL: CPT

## 2021-10-05 PROCEDURE — 83036 HEMOGLOBIN GLYCOSYLATED A1C: CPT

## 2021-10-05 PROCEDURE — 36415 COLL VENOUS BLD VENIPUNCTURE: CPT

## 2021-10-05 PROCEDURE — 80061 LIPID PANEL: CPT

## 2021-10-05 PROCEDURE — 84443 ASSAY THYROID STIM HORMONE: CPT

## 2021-10-05 PROCEDURE — 85025 COMPLETE CBC W/AUTO DIFF WBC: CPT

## 2021-10-20 ENCOUNTER — HOSPITAL ENCOUNTER (OUTPATIENT)
Dept: CT IMAGING | Facility: HOSPITAL | Age: 61
Discharge: HOME | End: 2021-10-20
Attending: INTERNAL MEDICINE
Payer: COMMERCIAL

## 2021-10-20 DIAGNOSIS — R91.1 PULMONARY NODULE: ICD-10-CM

## 2021-10-20 PROCEDURE — 71260 CT THORAX DX C+: CPT | Performed by: INTERNAL MEDICINE

## 2021-12-09 ENCOUNTER — IMMUNIZATION (OUTPATIENT)
Dept: LAB | Facility: HOSPITAL | Age: 61
End: 2021-12-09
Attending: EMERGENCY MEDICINE
Payer: COMMERCIAL

## 2021-12-09 DIAGNOSIS — Z23 NEED FOR VACCINATION: Primary | ICD-10-CM

## 2021-12-09 PROCEDURE — 0064A SARSCOV2 VAC 50MCG/0.25ML IM: CPT

## 2022-02-22 ENCOUNTER — APPOINTMENT (OUTPATIENT)
Dept: OCCUPATIONAL MEDICINE | Age: 62
End: 2022-02-22
Attending: FAMILY MEDICINE

## 2022-02-24 ENCOUNTER — APPOINTMENT (OUTPATIENT)
Dept: OCCUPATIONAL MEDICINE | Age: 62
End: 2022-02-24
Attending: FAMILY MEDICINE

## 2022-06-22 RX ORDER — LISINOPRIL 10 MG/1
TABLET ORAL
Qty: 90 TABLET | Refills: 1 | OUTPATIENT
Start: 2022-06-22

## 2022-06-23 RX ORDER — LISINOPRIL 10 MG/1
TABLET ORAL
Qty: 90 TABLET | Refills: 1 | Status: SHIPPED | OUTPATIENT
Start: 2022-06-23

## 2022-07-20 ENCOUNTER — OFFICE VISIT (OUTPATIENT)
Dept: FAMILY MEDICINE CLINIC | Facility: CLINIC | Age: 62
End: 2022-07-20
Payer: COMMERCIAL

## 2022-07-20 VITALS
DIASTOLIC BLOOD PRESSURE: 64 MMHG | RESPIRATION RATE: 16 BRPM | HEIGHT: 69 IN | SYSTOLIC BLOOD PRESSURE: 130 MMHG | OXYGEN SATURATION: 96 % | HEART RATE: 88 BPM | WEIGHT: 224.81 LBS | BODY MASS INDEX: 33.3 KG/M2 | TEMPERATURE: 98 F

## 2022-07-20 DIAGNOSIS — I10 PRIMARY HYPERTENSION: Primary | ICD-10-CM

## 2022-07-20 PROCEDURE — 3075F SYST BP GE 130 - 139MM HG: CPT | Performed by: NURSE PRACTITIONER

## 2022-07-20 PROCEDURE — 3078F DIAST BP <80 MM HG: CPT | Performed by: NURSE PRACTITIONER

## 2022-07-20 PROCEDURE — 3008F BODY MASS INDEX DOCD: CPT | Performed by: NURSE PRACTITIONER

## 2022-10-03 ENCOUNTER — LAB ENCOUNTER (OUTPATIENT)
Dept: LAB | Facility: HOSPITAL | Age: 62
End: 2022-10-03
Attending: INTERNAL MEDICINE
Payer: COMMERCIAL

## 2022-10-03 ENCOUNTER — OFFICE VISIT (OUTPATIENT)
Facility: CLINIC | Age: 62
End: 2022-10-03
Payer: COMMERCIAL

## 2022-10-03 VITALS
HEART RATE: 94 BPM | WEIGHT: 222.81 LBS | SYSTOLIC BLOOD PRESSURE: 100 MMHG | HEIGHT: 69 IN | DIASTOLIC BLOOD PRESSURE: 68 MMHG | RESPIRATION RATE: 18 BRPM | BODY MASS INDEX: 33 KG/M2 | TEMPERATURE: 98 F

## 2022-10-03 DIAGNOSIS — I10 ESSENTIAL HYPERTENSION WITH GOAL BLOOD PRESSURE LESS THAN 140/90: ICD-10-CM

## 2022-10-03 DIAGNOSIS — Z00.00 ROUTINE PHYSICAL EXAMINATION: ICD-10-CM

## 2022-10-03 DIAGNOSIS — Z00.00 ROUTINE PHYSICAL EXAMINATION: Primary | ICD-10-CM

## 2022-10-03 DIAGNOSIS — Z86.711 HISTORY OF PULMONARY EMBOLISM: ICD-10-CM

## 2022-10-03 LAB
ALBUMIN SERPL-MCNC: 3.4 G/DL (ref 3.4–5)
ALBUMIN/GLOB SERPL: 0.9 {RATIO} (ref 1–2)
ALP LIVER SERPL-CCNC: 49 U/L
ALT SERPL-CCNC: 38 U/L
ANION GAP SERPL CALC-SCNC: 5 MMOL/L (ref 0–18)
AST SERPL-CCNC: 27 U/L (ref 15–37)
BASOPHILS # BLD AUTO: 0.09 X10(3) UL (ref 0–0.2)
BASOPHILS NFR BLD AUTO: 1.1 %
BILIRUB SERPL-MCNC: 0.5 MG/DL (ref 0.1–2)
BUN BLD-MCNC: 21 MG/DL (ref 7–18)
BUN/CREAT SERPL: 19.6 (ref 10–20)
CALCIUM BLD-MCNC: 9.1 MG/DL (ref 8.5–10.1)
CHLORIDE SERPL-SCNC: 108 MMOL/L (ref 98–112)
CHOLEST SERPL-MCNC: 196 MG/DL (ref ?–200)
CO2 SERPL-SCNC: 28 MMOL/L (ref 21–32)
COMPLEXED PSA SERPL-MCNC: 2.58 NG/ML (ref ?–4)
CREAT BLD-MCNC: 1.07 MG/DL
DEPRECATED RDW RBC AUTO: 42.5 FL (ref 35.1–46.3)
EOSINOPHIL # BLD AUTO: 0.12 X10(3) UL (ref 0–0.7)
EOSINOPHIL NFR BLD AUTO: 1.5 %
ERYTHROCYTE [DISTWIDTH] IN BLOOD BY AUTOMATED COUNT: 12.8 % (ref 11–15)
EST. AVERAGE GLUCOSE BLD GHB EST-MCNC: 126 MG/DL (ref 68–126)
FASTING PATIENT LIPID ANSWER: YES
FASTING STATUS PATIENT QL REPORTED: YES
GFR SERPLBLD BASED ON 1.73 SQ M-ARVRAT: 79 ML/MIN/1.73M2 (ref 60–?)
GLOBULIN PLAS-MCNC: 4 G/DL (ref 2.8–4.4)
GLUCOSE BLD-MCNC: 99 MG/DL (ref 70–99)
HBA1C MFR BLD: 6 % (ref ?–5.7)
HCT VFR BLD AUTO: 45.2 %
HDLC SERPL-MCNC: 55 MG/DL (ref 40–59)
HGB BLD-MCNC: 14.8 G/DL
IMM GRANULOCYTES # BLD AUTO: 0.04 X10(3) UL (ref 0–1)
IMM GRANULOCYTES NFR BLD: 0.5 %
LDLC SERPL CALC-MCNC: 125 MG/DL (ref ?–100)
LYMPHOCYTES # BLD AUTO: 1.38 X10(3) UL (ref 1–4)
LYMPHOCYTES NFR BLD AUTO: 17 %
MCH RBC QN AUTO: 29.8 PG (ref 26–34)
MCHC RBC AUTO-ENTMCNC: 32.7 G/DL (ref 31–37)
MCV RBC AUTO: 91.1 FL
MONOCYTES # BLD AUTO: 0.78 X10(3) UL (ref 0.1–1)
MONOCYTES NFR BLD AUTO: 9.6 %
NEUTROPHILS # BLD AUTO: 5.7 X10 (3) UL (ref 1.5–7.7)
NEUTROPHILS # BLD AUTO: 5.7 X10(3) UL (ref 1.5–7.7)
NEUTROPHILS NFR BLD AUTO: 70.3 %
NONHDLC SERPL-MCNC: 141 MG/DL (ref ?–130)
OSMOLALITY SERPL CALC.SUM OF ELEC: 295 MOSM/KG (ref 275–295)
PLATELET # BLD AUTO: 287 10(3)UL (ref 150–450)
POTASSIUM SERPL-SCNC: 5 MMOL/L (ref 3.5–5.1)
PROT SERPL-MCNC: 7.4 G/DL (ref 6.4–8.2)
RBC # BLD AUTO: 4.96 X10(6)UL
SODIUM SERPL-SCNC: 141 MMOL/L (ref 136–145)
TRIGL SERPL-MCNC: 88 MG/DL (ref 30–149)
TSI SER-ACNC: 0.74 MIU/ML (ref 0.36–3.74)
VIT B12 SERPL-MCNC: 382 PG/ML (ref 193–986)
VLDLC SERPL CALC-MCNC: 16 MG/DL (ref 0–30)
WBC # BLD AUTO: 8.1 X10(3) UL (ref 4–11)

## 2022-10-03 PROCEDURE — 83036 HEMOGLOBIN GLYCOSYLATED A1C: CPT

## 2022-10-03 PROCEDURE — 36415 COLL VENOUS BLD VENIPUNCTURE: CPT

## 2022-10-03 PROCEDURE — 85025 COMPLETE CBC W/AUTO DIFF WBC: CPT

## 2022-10-03 PROCEDURE — 84443 ASSAY THYROID STIM HORMONE: CPT

## 2022-10-03 PROCEDURE — 80061 LIPID PANEL: CPT

## 2022-10-03 PROCEDURE — 3074F SYST BP LT 130 MM HG: CPT | Performed by: INTERNAL MEDICINE

## 2022-10-03 PROCEDURE — 3008F BODY MASS INDEX DOCD: CPT | Performed by: INTERNAL MEDICINE

## 2022-10-03 PROCEDURE — 99396 PREV VISIT EST AGE 40-64: CPT | Performed by: INTERNAL MEDICINE

## 2022-10-03 PROCEDURE — 82607 VITAMIN B-12: CPT

## 2022-10-03 PROCEDURE — 3078F DIAST BP <80 MM HG: CPT | Performed by: INTERNAL MEDICINE

## 2022-10-03 PROCEDURE — 80053 COMPREHEN METABOLIC PANEL: CPT

## 2022-10-03 RX ORDER — LISINOPRIL 10 MG/1
10 TABLET ORAL DAILY
Qty: 90 TABLET | Refills: 3 | Status: SHIPPED | OUTPATIENT
Start: 2022-10-03

## 2022-10-06 ENCOUNTER — NURSE ONLY (OUTPATIENT)
Dept: LAB | Age: 62
End: 2022-10-06
Attending: EMERGENCY MEDICINE
Payer: COMMERCIAL

## 2022-10-06 DIAGNOSIS — Z23 NEED FOR VACCINATION: Primary | ICD-10-CM

## 2022-10-06 PROCEDURE — 90471 IMMUNIZATION ADMIN: CPT

## 2022-10-06 PROCEDURE — 90686 IIV4 VACC NO PRSV 0.5 ML IM: CPT

## 2023-01-20 ENCOUNTER — OFFICE VISIT (OUTPATIENT)
Dept: FAMILY MEDICINE CLINIC | Facility: CLINIC | Age: 63
End: 2023-01-20
Payer: COMMERCIAL

## 2023-01-20 VITALS
HEIGHT: 69 IN | WEIGHT: 226 LBS | HEART RATE: 79 BPM | SYSTOLIC BLOOD PRESSURE: 128 MMHG | RESPIRATION RATE: 16 BRPM | TEMPERATURE: 98 F | DIASTOLIC BLOOD PRESSURE: 79 MMHG | BODY MASS INDEX: 33.47 KG/M2 | OXYGEN SATURATION: 97 %

## 2023-01-20 DIAGNOSIS — J02.9 SORE THROAT: ICD-10-CM

## 2023-01-20 DIAGNOSIS — H65.01 RIGHT ACUTE SEROUS OTITIS MEDIA, RECURRENCE NOT SPECIFIED: Primary | ICD-10-CM

## 2023-01-20 LAB
CONTROL LINE PRESENT WITH A CLEAR BACKGROUND (YES/NO): YES YES/NO
KIT EXPIRATION DATE: NORMAL DATE

## 2023-01-20 PROCEDURE — 99213 OFFICE O/P EST LOW 20 MIN: CPT | Performed by: NURSE PRACTITIONER

## 2023-01-20 PROCEDURE — 3074F SYST BP LT 130 MM HG: CPT | Performed by: NURSE PRACTITIONER

## 2023-01-20 PROCEDURE — 87880 STREP A ASSAY W/OPTIC: CPT | Performed by: NURSE PRACTITIONER

## 2023-01-20 PROCEDURE — 3008F BODY MASS INDEX DOCD: CPT | Performed by: NURSE PRACTITIONER

## 2023-01-20 PROCEDURE — 3078F DIAST BP <80 MM HG: CPT | Performed by: NURSE PRACTITIONER

## 2023-01-20 RX ORDER — AMOXICILLIN 875 MG/1
875 TABLET, COATED ORAL 2 TIMES DAILY
Qty: 14 TABLET | Refills: 0 | Status: SHIPPED | OUTPATIENT
Start: 2023-01-20 | End: 2023-01-27

## 2023-01-20 NOTE — PATIENT INSTRUCTIONS
Mucinex as directed on packaging. Flonase as directed on packaging     See attached patient care instructions.

## 2023-02-27 ENCOUNTER — APPOINTMENT (OUTPATIENT)
Dept: OCCUPATIONAL MEDICINE | Age: 63
End: 2023-02-27
Attending: FAMILY MEDICINE

## 2023-05-09 ENCOUNTER — OFFICE VISIT (OUTPATIENT)
Dept: OPHTHALMOLOGY | Facility: CLINIC | Age: 63
End: 2023-05-09
Payer: COMMERCIAL

## 2023-05-09 DIAGNOSIS — H43.393 VITREOUS FLOATERS OF BOTH EYES: ICD-10-CM

## 2023-05-09 DIAGNOSIS — H25.13 AGE-RELATED NUCLEAR CATARACT OF BOTH EYES: Primary | ICD-10-CM

## 2023-05-09 PROCEDURE — 92004 COMPRE OPH EXAM NEW PT 1/>: CPT | Performed by: OPHTHALMOLOGY

## 2023-05-09 NOTE — PATIENT INSTRUCTIONS
Age-related nuclear cataract of both eyes  Discussed early cataracts with patient. Told patient that cataracts are age appropriate and they are not surgical at this time. No treatment recommended at this time. Will see patient in 2 years for a complete exam      Vitreous floaters of both eyes   There is no evidence of retinal pathology. All signs and symptoms of retinal detachment/tears explained in detail. Patient instructed to call the office if they experience increase in floaters, increase in flashes of light, loss of vision or curtain or veil effect.

## 2023-05-09 NOTE — ASSESSMENT & PLAN NOTE
Discussed early cataracts with patient. Told patient that cataracts are age appropriate and they are not surgical at this time. No treatment recommended at this time.      Will see patient in 2 years for a complete exam

## 2023-09-12 RX ORDER — LISINOPRIL 10 MG/1
10 TABLET ORAL DAILY
Qty: 90 TABLET | Refills: 0 | Status: SHIPPED | OUTPATIENT
Start: 2023-09-12

## 2023-09-29 ENCOUNTER — IMMUNIZATION (OUTPATIENT)
Dept: FAMILY MEDICINE CLINIC | Facility: CLINIC | Age: 63
End: 2023-09-29
Payer: COMMERCIAL

## 2023-09-29 DIAGNOSIS — Z23 NEED FOR INFLUENZA VACCINATION: Primary | ICD-10-CM

## 2023-09-29 PROCEDURE — 90686 IIV4 VACC NO PRSV 0.5 ML IM: CPT | Performed by: NURSE PRACTITIONER

## 2023-09-29 PROCEDURE — 90471 IMMUNIZATION ADMIN: CPT | Performed by: NURSE PRACTITIONER

## 2023-11-15 ENCOUNTER — OFFICE VISIT (OUTPATIENT)
Dept: INTERNAL MEDICINE CLINIC | Facility: CLINIC | Age: 63
End: 2023-11-15

## 2023-11-15 VITALS
RESPIRATION RATE: 18 BRPM | DIASTOLIC BLOOD PRESSURE: 68 MMHG | SYSTOLIC BLOOD PRESSURE: 106 MMHG | WEIGHT: 222 LBS | HEIGHT: 69 IN | TEMPERATURE: 98 F | BODY MASS INDEX: 32.88 KG/M2 | HEART RATE: 72 BPM

## 2023-11-15 DIAGNOSIS — Z00.00 ROUTINE PHYSICAL EXAMINATION: Primary | ICD-10-CM

## 2023-11-15 DIAGNOSIS — Z86.711 HISTORY OF PULMONARY EMBOLISM: ICD-10-CM

## 2023-11-15 DIAGNOSIS — I10 ESSENTIAL HYPERTENSION WITH GOAL BLOOD PRESSURE LESS THAN 140/90: ICD-10-CM

## 2023-11-15 DIAGNOSIS — Z12.11 COLON CANCER SCREENING: ICD-10-CM

## 2023-11-15 PROCEDURE — 3008F BODY MASS INDEX DOCD: CPT | Performed by: INTERNAL MEDICINE

## 2023-11-15 PROCEDURE — 3074F SYST BP LT 130 MM HG: CPT | Performed by: INTERNAL MEDICINE

## 2023-11-15 PROCEDURE — 3078F DIAST BP <80 MM HG: CPT | Performed by: INTERNAL MEDICINE

## 2023-11-15 PROCEDURE — 99396 PREV VISIT EST AGE 40-64: CPT | Performed by: INTERNAL MEDICINE

## 2023-11-15 RX ORDER — LISINOPRIL 10 MG/1
10 TABLET ORAL DAILY
Qty: 90 TABLET | Refills: 1 | Status: SHIPPED | OUTPATIENT
Start: 2023-11-15

## 2023-11-16 ENCOUNTER — LAB ENCOUNTER (OUTPATIENT)
Dept: LAB | Facility: HOSPITAL | Age: 63
End: 2023-11-16
Attending: INTERNAL MEDICINE
Payer: COMMERCIAL

## 2023-11-16 DIAGNOSIS — Z00.00 ROUTINE PHYSICAL EXAMINATION: ICD-10-CM

## 2023-11-16 LAB
ALBUMIN SERPL-MCNC: 4.4 G/DL (ref 3.2–4.8)
ALBUMIN/GLOB SERPL: 1.3 {RATIO} (ref 1–2)
ALP LIVER SERPL-CCNC: 45 U/L
ALT SERPL-CCNC: 23 U/L
ANION GAP SERPL CALC-SCNC: 7 MMOL/L (ref 0–18)
AST SERPL-CCNC: 24 U/L (ref ?–34)
BASOPHILS # BLD AUTO: 0.07 X10(3) UL (ref 0–0.2)
BASOPHILS NFR BLD AUTO: 0.9 %
BILIRUB SERPL-MCNC: 0.6 MG/DL (ref 0.2–1.1)
BUN BLD-MCNC: 19 MG/DL (ref 9–23)
BUN/CREAT SERPL: 17.3 (ref 10–20)
CALCIUM BLD-MCNC: 9.3 MG/DL (ref 8.7–10.4)
CHLORIDE SERPL-SCNC: 106 MMOL/L (ref 98–112)
CHOLEST SERPL-MCNC: 188 MG/DL (ref ?–200)
CO2 SERPL-SCNC: 27 MMOL/L (ref 21–32)
CREAT BLD-MCNC: 1.1 MG/DL
DEPRECATED RDW RBC AUTO: 41.5 FL (ref 35.1–46.3)
EGFRCR SERPLBLD CKD-EPI 2021: 76 ML/MIN/1.73M2 (ref 60–?)
EOSINOPHIL # BLD AUTO: 0.14 X10(3) UL (ref 0–0.7)
EOSINOPHIL NFR BLD AUTO: 1.8 %
ERYTHROCYTE [DISTWIDTH] IN BLOOD BY AUTOMATED COUNT: 12.8 % (ref 11–15)
EST. AVERAGE GLUCOSE BLD GHB EST-MCNC: 128 MG/DL (ref 68–126)
FASTING PATIENT LIPID ANSWER: YES
FASTING STATUS PATIENT QL REPORTED: YES
GLOBULIN PLAS-MCNC: 3.3 G/DL (ref 2.8–4.4)
GLUCOSE BLD-MCNC: 105 MG/DL (ref 70–99)
HBA1C MFR BLD: 6.1 % (ref ?–5.7)
HCT VFR BLD AUTO: 42.9 %
HDLC SERPL-MCNC: 54 MG/DL (ref 40–59)
HGB BLD-MCNC: 14.5 G/DL
IMM GRANULOCYTES # BLD AUTO: 0.03 X10(3) UL (ref 0–1)
IMM GRANULOCYTES NFR BLD: 0.4 %
LDLC SERPL CALC-MCNC: 122 MG/DL (ref ?–100)
LYMPHOCYTES # BLD AUTO: 1.39 X10(3) UL (ref 1–4)
LYMPHOCYTES NFR BLD AUTO: 17.9 %
MCH RBC QN AUTO: 29.8 PG (ref 26–34)
MCHC RBC AUTO-ENTMCNC: 33.8 G/DL (ref 31–37)
MCV RBC AUTO: 88.1 FL
MONOCYTES # BLD AUTO: 0.71 X10(3) UL (ref 0.1–1)
MONOCYTES NFR BLD AUTO: 9.1 %
NEUTROPHILS # BLD AUTO: 5.43 X10 (3) UL (ref 1.5–7.7)
NEUTROPHILS # BLD AUTO: 5.43 X10(3) UL (ref 1.5–7.7)
NEUTROPHILS NFR BLD AUTO: 69.9 %
NONHDLC SERPL-MCNC: 134 MG/DL (ref ?–130)
OSMOLALITY SERPL CALC.SUM OF ELEC: 293 MOSM/KG (ref 275–295)
PLATELET # BLD AUTO: 266 10(3)UL (ref 150–450)
POTASSIUM SERPL-SCNC: 4.6 MMOL/L (ref 3.5–5.1)
PROT SERPL-MCNC: 7.7 G/DL (ref 5.7–8.2)
RBC # BLD AUTO: 4.87 X10(6)UL
SODIUM SERPL-SCNC: 140 MMOL/L (ref 136–145)
TRIGL SERPL-MCNC: 67 MG/DL (ref 30–149)
TSI SER-ACNC: 0.72 MIU/ML (ref 0.55–4.78)
VLDLC SERPL CALC-MCNC: 12 MG/DL (ref 0–30)
WBC # BLD AUTO: 7.8 X10(3) UL (ref 4–11)

## 2023-11-16 PROCEDURE — 80061 LIPID PANEL: CPT

## 2023-11-16 PROCEDURE — 84443 ASSAY THYROID STIM HORMONE: CPT

## 2023-11-16 PROCEDURE — 85025 COMPLETE CBC W/AUTO DIFF WBC: CPT

## 2023-11-16 PROCEDURE — 80053 COMPREHEN METABOLIC PANEL: CPT

## 2023-11-16 PROCEDURE — 36415 COLL VENOUS BLD VENIPUNCTURE: CPT

## 2023-11-16 PROCEDURE — 83036 HEMOGLOBIN GLYCOSYLATED A1C: CPT

## 2023-11-17 LAB
COMPLEXED PSA SERPL-MCNC: 1.63 NG/ML (ref ?–4)
COMPLEXED PSA SERPL-MCNC: 2.14 NG/ML (ref ?–4)

## 2024-02-26 ENCOUNTER — APPOINTMENT (OUTPATIENT)
Dept: OCCUPATIONAL MEDICINE | Age: 64
End: 2024-02-26
Attending: NURSE PRACTITIONER

## 2024-03-14 ENCOUNTER — TELEPHONE (OUTPATIENT)
Facility: CLINIC | Age: 64
End: 2024-03-14

## 2024-03-14 DIAGNOSIS — Z12.11 SPECIAL SCREENING FOR MALIGNANT NEOPLASMS, COLON: Primary | ICD-10-CM

## 2024-03-14 NOTE — TELEPHONE ENCOUNTER
Scheduled for:  Colonoscopy [id number]  Provider Name:  Dr. Hammond  Date:  6/12/2024  Location:  [facility]  Sedation:  MAC  Time:  7:30am, (pt is aware to arrive at 6:30am)   Prep:  Miralax  Meds/Allergies Reconciled?:  Physician reviewed     Diagnosis with codes:  Colon Cancer Screen Z12.11  Was patient informed to call insurance with codes (Y/N):  Yes, I confirmed [employer] insurance with this patient.      Referral sent?:  Referral was sent at the time of electronic surgical scheduling.   EM or [facility] notified?:  I sent an electronic request to Endo Scheduling and received a confirmation today.      Medication Orders:  n/a  Misc Orders:  n/a     Further instructions given by staff:   I discussed the prep instructions with the patient which he verbally understood and is aware that I will mail the instructions today.

## 2024-03-14 NOTE — TELEPHONE ENCOUNTER
If no symptoms may schedule a screening colonoscopy following a split dose MiraLAX/Gatorade preparation and either IV sedation or monitored anesthesia care per scheduling.

## 2024-03-14 NOTE — TELEPHONE ENCOUNTER
Patient called to schedule recall colonscopy.    Last Procedure, Date, MD: Colonscopy with Biopsy. 8/14/2013, Dr. MAGO Hammond      Last Diagnosis:  8/14/2013, Mild residual diverticulosis, Focal erosions splenic fixtures, Normal Colocolonic anastomosis    Recalled (mth/yrs): 10 years  Sedation Used Previously:  IV sedation  Last Prep Used (if known):  unknown  Quality Of Prep (if known): very good  Anticoagulants: No, Low dose ASA  Diuretics: No  Diabetic Med's (PO/Injectables): No  Weight loss Med's: No  Iron/Herbal/Multivitamin Supplements (RX/OTC): OTC MVI  Marijuana/Vaping/CBD: No  Height & Weight: 5'10, 225lb  BMI: 32.3  Hx of Cardiac/CVA Issues/(MI/Stroke): No  Devices Pacemaker/Defibrillator/Stents: NO  Respiratory Issues/Oxygen Use/ROSI/COPD: No  Issues w/ Anesthesia: No    Symptoms (Y/N): No  Symptoms Details:     Special Comments/Notes: Medication, pharmacy, and allergies approved.    Please advise on orders and prep.     Thank you!       Operative Report  Larry Hammond MD (Physician)  GASTROENTEROLOGY  Unsigned  Patient:   TOMÁS CARPENTER  YOLANDA #:   [id number]                    Room: [facility] Goldman #:  [id number]     ADMITTED:   08/14/2013        DATE OF PROCEDURE: 8/14/13     PREOPERATIVE DIAGNOSIS: Colorectal cancer screening.     POSTOPERATIVE DIAGNOSES:  1. Mild residual diverticulosis.  2. Focal erosions splenic flexure.  3. Normal colocolonic anastomosis.     PROCEDURE: Colonoscopy with biopsy.     SURGEON: MAGO Hammond MD     ATTENDING: Vic Faulkner MD     ANESTHESIA:  1. Fentanyl 100 mcg intravenous.  2. Versed 7 mg intravenous in divided doses.     BRIEF HISTORY: This is a 52-year-old male who presents for a   screening  colonoscopy. He has had no gastrointestinal tract symptoms, signs   or family  history. The patient has a history of recurrent diverticulitis and  underwent a sigmoid resection about 10 years prior.     TECHNIQUE:   The patient was placed in the left lateral  recumbent   position.  Digital rectal examination revealed no palpable intraluminal   abnormalities.  An Olympus variable stiffness 180 series HD colonoscope was   inserted into  the rectum and advanced under direct vision by following the lumen   to the  cecum, the ileocecal valve and into the terminal ileum.  The colon   was  examined upon withdrawal in the left lateral recumbent position.     ENDOSCOPIC FINDINGS: Preparation of the colon was very good.  The   terminal  ileum mucosa was normal. The ileocecal valve well preserved.  The   colonic  haustrations were distinct.  The mucosa from the cecum to the anal   verge  was normal with an intact vascular pattern. There was an   occasional  residual diverticulum seen in the transverse and agnieszka-left colon.   At about  18 cm upon withdrawal was a wide open ring-like colocolonic   anastomosis. In  the splenic flexure was an approximately 7 mm erosion, which had a   benign  appearance. This was biopsied. There were no colonic polyps, mass   lesions  or vascular anomalies seen.  Retroflexion in the rectum and pull   through  revealed incidental internal hemorrhoids.  The patient tolerated   this  procedure well without immediate complications.     IMPRESSION:  1. Mild residual diverticulosis.  2. Focal erosion splenic flexure.  3. Normal colocolonic anastomosis.     RECOMMENDATIONS:  1. Maintain high fiber diet.  2. Follow up biopsy results.  3. If biopsies of erosions are non adenomatous, repeat colonoscopy   in 10     years unless any new symptoms or signs are noted.              D:    08/14/201310:52 A  T:    08/14/2013 12:19 P  ATTENDING:  MARY Hammond MD 0800  DICTATING:    MARY Hammond MD 0800  MD ID #:      [id number]  cc:   Vic Faulkner MD [id number]        MARY Hammond MD 0800

## 2024-04-27 ENCOUNTER — OFFICE VISIT (OUTPATIENT)
Dept: FAMILY MEDICINE CLINIC | Facility: CLINIC | Age: 64
End: 2024-04-27
Payer: COMMERCIAL

## 2024-04-27 VITALS
OXYGEN SATURATION: 96 % | DIASTOLIC BLOOD PRESSURE: 87 MMHG | HEIGHT: 70 IN | HEART RATE: 72 BPM | TEMPERATURE: 98 F | BODY MASS INDEX: 32.5 KG/M2 | RESPIRATION RATE: 16 BRPM | WEIGHT: 227 LBS | SYSTOLIC BLOOD PRESSURE: 131 MMHG

## 2024-04-27 DIAGNOSIS — H60.392 ABRASION OF LEFT EAR CANAL WITH INFECTION, INITIAL ENCOUNTER: Primary | ICD-10-CM

## 2024-04-27 DIAGNOSIS — S00.412A ABRASION OF LEFT EAR CANAL WITH INFECTION, INITIAL ENCOUNTER: Primary | ICD-10-CM

## 2024-04-27 PROCEDURE — 3075F SYST BP GE 130 - 139MM HG: CPT | Performed by: NURSE PRACTITIONER

## 2024-04-27 PROCEDURE — 3079F DIAST BP 80-89 MM HG: CPT | Performed by: NURSE PRACTITIONER

## 2024-04-27 PROCEDURE — 3008F BODY MASS INDEX DOCD: CPT | Performed by: NURSE PRACTITIONER

## 2024-04-27 PROCEDURE — 99213 OFFICE O/P EST LOW 20 MIN: CPT | Performed by: NURSE PRACTITIONER

## 2024-04-27 RX ORDER — NEOMYCIN SULFATE, POLYMYXIN B SULFATE AND HYDROCORTISONE 10; 3.5; 1 MG/ML; MG/ML; [USP'U]/ML
4 SUSPENSION/ DROPS AURICULAR (OTIC) 3 TIMES DAILY
Qty: 1 EACH | Refills: 0 | Status: SHIPPED | OUTPATIENT
Start: 2024-04-27 | End: 2024-05-04

## 2024-04-27 NOTE — PROGRESS NOTES
CHIEF COMPLAINT:     Chief Complaint   Patient presents with    Ear Problem     Lf ear pain, fluid, night sweats,SX x couple weeks   Denies fever  Denies OTC medication       HPI:   Chapito Jackson is a 63 year old male who presents to clinic today with complaints of left ear discomfort. Has had for a \"couple\" of weeks.  Pain is described as minimal, mainly fluid in ear.  Associated symptoms:  + drainage, states used q-tip when noticed drainage, occ. night sweats.  Noticed yesterday during haircut, blow dryer passed by ear and felt sharp pain.   Patient denies history of ear infections.  Denies changes in hearing.  No recent URI symptoms.  Home treatment includes none.       Current Outpatient Medications   Medication Sig Dispense Refill    lisinopril 10 MG Oral Tab Take 1 tablet (10 mg total) by mouth daily. 90 tablet 1    aspirin 81 MG Oral Tab EC Take 1 tablet (81 mg total) by mouth daily.        Past Medical History:    Chest pain    normal stress nuclear    Diverticulitis    Essential hypertension    Herpes simplex virus type 1 (HSV-1) dermatitis    genital involvement - medication    Lipid screening    PE (pulmonary thromboembolism) (HCC)      Social History:  Social History     Socioeconomic History    Marital status:    Tobacco Use    Smoking status: Never    Smokeless tobacco: Never   Vaping Use    Vaping status: Never Used   Substance and Sexual Activity    Alcohol use: Yes     Comment: beer & wine (2 drinks) weekly    Drug use: No    Sexual activity: Yes     Partners: Female   Other Topics Concern    Caffeine Concern Yes     Comment: coffee, 2 cups/day        REVIEW OF SYSTEMS:   GENERAL: Feeling well otherwise.    SKIN: no unusual skin lesions or rashes  HEENT: See HPI  LUNGS: No cough, shortness of breath, or wheezing.  CARDIOVASCULAR: No chest pain, palpitations  GI: No N/V/C/D.  NEURO: denies headaches or dizziness    EXAM:   /87   Pulse 72   Temp 97.8 °F (36.6 °C)   Resp 16   Ht 5'  10\" (1.778 m)   Wt 227 lb (103 kg)   SpO2 96%   BMI 32.57 kg/m²     Physical Exam  Vitals reviewed.   Constitutional:       General: He is not in acute distress.     Appearance: Normal appearance.   HENT:      Head: Normocephalic and atraumatic.      Right Ear: Hearing, tympanic membrane and external ear normal. Tympanic membrane is not perforated, erythematous, retracted or bulging.      Left Ear: Tympanic membrane and external ear normal. Decreased hearing (slightly duller in left compared to right with finger rub) noted. Laceration (abrasion on canal at 3 o'clock with dried blood, slight swelling and tenderness) present. Tympanic membrane is not perforated, erythematous, retracted or bulging.      Nose: Nose normal.      Mouth/Throat:      Lips: Pink.      Mouth: Mucous membranes are moist.      Pharynx: Oropharynx is clear. Uvula midline.   Eyes:      Extraocular Movements: Extraocular movements intact.      Conjunctiva/sclera: Conjunctivae normal.   Cardiovascular:      Rate and Rhythm: Normal rate and regular rhythm.      Heart sounds: Normal heart sounds. No murmur heard.  Pulmonary:      Effort: Pulmonary effort is normal.      Breath sounds: Normal breath sounds.   Musculoskeletal:      Cervical back: Normal range of motion and neck supple.   Lymphadenopathy:      Head:      Right side of head: No preauricular or posterior auricular adenopathy.      Left side of head: No preauricular or posterior auricular adenopathy.      Cervical: No cervical adenopathy.   Skin:     General: Skin is warm and dry.   Neurological:      Mental Status: He is alert.             ASSESSMENT AND PLAN:   Chapito Jackson is a 63 year old male who presents with:    ASSESSMENT:  Encounter Diagnosis   Name Primary?    Abrasion of left ear canal with infection, initial encounter Yes       PLAN:   Discussed abrasion in ear canal.  Inflammation vs early start of infection.  Will cover with antibiotic drops.  To monitor for worsening  symptoms and follow up if needed.  Comfort measures as described in Patient Instructions  Meds as listed below.      Meds & Refills for this Visit:  Requested Prescriptions     Signed Prescriptions Disp Refills    neomycin-polymyxin-hydrocortisone 3.5-36733-0 Otic Suspension 1 each 0     Sig: Place 4 drops into the left ear 3 (three) times daily for 7 days.       Risks, benefits, side effects of medication explained and discussed.     Stressed importance of completing full course of antibiotic.   Call or return if symptoms worsen, do not improve in 3 days, or if fever of 100.4 or greater persists for 72 hours.  Patient voiced understand and is in agreement with treatment plan.    Patient Instructions   Stressed importance of completing full course of antibiotic ear drops, even if feeling better.  Tylenol/Motrin as needed for pain.  Avoid swimming and long hot showers for at least a week.     Otitis Externa   WHAT YOU SHOULD KNOW:   Otitis externa, or swimmer's ear, is an infection in the outer ear canal. This canal goes from the outside of the ear to the eardrum.      INSTRUCTIONS:   Medicines:   Ibuprofen or acetaminophen: These help decrease your pain and fever. They are available without a doctor's order. Ask which medicine is right for you. Ask how much to take and how often to take it. These medicines can cause stomach bleeding if not taken correctly. Ibuprofen can cause kidney damage. Do not take ibuprofen if you have kidney disease, an ulcer, or allergies to aspirin. Acetaminophen can cause liver damage. Do not drink alcohol if you take acetaminophen.  Ear drops: These are a combination of a steroid medicine and an antibiotic. The steroid helps decrease redness, swelling, and pain. The antibiotic helps kill the germs that caused your ear infection.   Ear wicking: Caregivers will remove fluid or wax from your outer ear canal. They may insert a small tube, called a wick, into your ear to help drain fluid. A  wick also may be used to put medicine into your ear canal if the canal is blocked.  Take your medicine as directed: Call your primary healthcare provider if you think your medicine is not helping or if you have side effects. Tell him if you are allergic to any medicine. Keep a list of the medicines, vitamins, and herbs you take. Include the amounts, and when and why you take them. Bring the list or the pill bottles to follow-up visits. Carry your medicine list with you in case of an emergency.   Follow up with your primary healthcare provider or otolaryngologist as directed: Write down your questions so you remember to ask them during your visits.  Ear care:   Do not put cotton swabs or foreign objects in your ears.  Wrap a clean moist washcloth around your finger, and use it to clean your outer ear and remove extra ear wax.   Use ear plugs when you swim. Dry your outer ears completely after you swim or bathe.   How to use eardrops:   Lie down on your side with your infected ear facing up.  Carefully drip the correct number of eardrops into your ear. Have another person help you if possible.  Gently move the outside part of your ear back and forth to help the medicine reach your ear canal.   Stay lying down in the same position (with your ear facing up) for 3 to 5 minutes.   Contact your primary healthcare provider or otolaryngologist if:   Your signs and symptoms do not get better after 2 days of treatment.   Your signs and symptoms go away for a time, but then come back.   You have questions or concerns about your condition or care.   Return to the emergency department if:   You have a fever.  You have severe ear pain.  You suddenly are unable to hear at all.  You have new swelling in your face, behind your ears, or in your neck.  You suddenly cannot move part of your face.  Your face suddenly feels numb.   Copyright © 2012. [employer]. All rights reserved. Information is for End User's use only and may not  be sold, redistributed or otherwise used for commercial purposes.  The above information is an  only. It is not intended as medical advice for individual conditions or treatments. Talk to your doctor, nurse or pharmacist before following any medical regimen to see if it is safe and effective for you.

## 2024-04-27 NOTE — PATIENT INSTRUCTIONS
Stressed importance of completing full course of antibiotic ear drops, even if feeling better.  Tylenol/Motrin as needed for pain.  Avoid swimming and long hot showers for at least a week.     Otitis Externa   WHAT YOU SHOULD KNOW:   Otitis externa, or swimmer's ear, is an infection in the outer ear canal. This canal goes from the outside of the ear to the eardrum.      INSTRUCTIONS:   Medicines:   Ibuprofen or acetaminophen: These help decrease your pain and fever. They are available without a doctor's order. Ask which medicine is right for you. Ask how much to take and how often to take it. These medicines can cause stomach bleeding if not taken correctly. Ibuprofen can cause kidney damage. Do not take ibuprofen if you have kidney disease, an ulcer, or allergies to aspirin. Acetaminophen can cause liver damage. Do not drink alcohol if you take acetaminophen.  Ear drops: These are a combination of a steroid medicine and an antibiotic. The steroid helps decrease redness, swelling, and pain. The antibiotic helps kill the germs that caused your ear infection.   Ear wicking: Caregivers will remove fluid or wax from your outer ear canal. They may insert a small tube, called a wick, into your ear to help drain fluid. A wick also may be used to put medicine into your ear canal if the canal is blocked.  Take your medicine as directed: Call your primary healthcare provider if you think your medicine is not helping or if you have side effects. Tell him if you are allergic to any medicine. Keep a list of the medicines, vitamins, and herbs you take. Include the amounts, and when and why you take them. Bring the list or the pill bottles to follow-up visits. Carry your medicine list with you in case of an emergency.   Follow up with your primary healthcare provider or otolaryngologist as directed: Write down your questions so you remember to ask them during your visits.  Ear care:   Do not put cotton swabs or foreign objects in  your ears.  Wrap a clean moist washcloth around your finger, and use it to clean your outer ear and remove extra ear wax.   Use ear plugs when you swim. Dry your outer ears completely after you swim or bathe.   How to use eardrops:   Lie down on your side with your infected ear facing up.  Carefully drip the correct number of eardrops into your ear. Have another person help you if possible.  Gently move the outside part of your ear back and forth to help the medicine reach your ear canal.   Stay lying down in the same position (with your ear facing up) for 3 to 5 minutes.   Contact your primary healthcare provider or otolaryngologist if:   Your signs and symptoms do not get better after 2 days of treatment.   Your signs and symptoms go away for a time, but then come back.   You have questions or concerns about your condition or care.   Return to the emergency department if:   You have a fever.  You have severe ear pain.  You suddenly are unable to hear at all.  You have new swelling in your face, behind your ears, or in your neck.  You suddenly cannot move part of your face.  Your face suddenly feels numb.   Copyright © 2012. [employer]. All rights reserved. Information is for End User's use only and may not be sold, redistributed or otherwise used for commercial purposes.  The above information is an  only. It is not intended as medical advice for individual conditions or treatments. Talk to your doctor, nurse or pharmacist before following any medical regimen to see if it is safe and effective for you.

## 2024-06-04 ENCOUNTER — TELEPHONE (OUTPATIENT)
Facility: CLINIC | Age: 64
End: 2024-06-04

## 2024-06-04 NOTE — TELEPHONE ENCOUNTER
Patient has questions regarding preparation instructions for 6/12/2024 colonoscopy.  Please call.  Thank you.

## 2024-06-05 NOTE — TELEPHONE ENCOUNTER
Called and spoke to the patient, date of birth and name verified.    The patient was informed prep instructions was sent in the MyChart on 3/14/2024    He agreed to check the instructions in the MyChart.

## 2024-06-08 RX ORDER — LISINOPRIL 10 MG/1
10 TABLET ORAL DAILY
Qty: 90 TABLET | Refills: 1 | Status: SHIPPED | OUTPATIENT
Start: 2024-06-08

## 2024-06-08 NOTE — TELEPHONE ENCOUNTER
Refill passed per [facility] protocol.    Requested Prescriptions   Pending Prescriptions Disp Refills    LISINOPRIL 10 MG Oral Tab [Pharmacy Med Name: Lisinopril 10 Mg Tab Lupi] 90 tablet 0     Sig: TAKE ONE TABLET BY MOUTH ONE TIME DAILY       Hypertension Medications Protocol Passed - 6/5/2024  1:32 AM        Passed - CMP or BMP in past 12 months        Passed - Last BP reading less than 140/90     BP Readings from Last 1 Encounters:   04/27/24 131/87               Passed - In person appointment or virtual visit in the past 12 mos or appointment in next 3 mos     Recent Outpatient Visits              1 month ago Abrasion of left ear canal with infection, initial encounter    [facility] Rachael Cordoba APRN    Office Visit    6 months ago Routine physical examination    [facility] Vic Faulkner MD    Office Visit    1 year ago Age-related nuclear cataract of both eyes    [facility] Pablo Cancino MD    Office Visit    1 year ago Right acute serous otitis media, recurrence not specified    [facility] APN    Office Visit    1 year ago Need for vaccination    [facility]    Nurse Only          Future Appointments         Provider Department Appt Notes    In 4 days STATHOPOULOS, PROCEDURE [facility] CLN w/ MAC @ [facility]                    Passed - EGFRCR or GFRNAA > 50     GFR Evaluation  EGFRCR: 76 , resulted on 11/16/2023             Recent Outpatient Visits              1 month ago Abrasion of left ear canal with infection, initial encounter    [facility] Rachael Cordoba APRN    Office Visit    6  months ago Routine physical examination    [facility] Vic Faulkner MD    Office Visit    1 year ago Age-related nuclear cataract of both eyes    [facility]    Office Visit    1 year ago Right acute serous otitis media, recurrence not specified    [facility], [facility] Gareth Calles Jr., APN    Office Visit    1 year ago Need for vaccination    [facility] [address], [place]    Nurse Only          Future Appointments         Provider Department Appt Notes    In 4 days STATHOPOULOS, PROCEDURE [facility] CLN w/ MAC @ [facility]

## 2024-06-12 ENCOUNTER — ANESTHESIA EVENT (OUTPATIENT)
Dept: ENDOSCOPY | Age: 64
End: 2024-06-12
Payer: COMMERCIAL

## 2024-06-12 ENCOUNTER — HOSPITAL ENCOUNTER (OUTPATIENT)
Age: 64
Setting detail: HOSPITAL OUTPATIENT SURGERY
Discharge: HOME | End: 2024-06-12
Attending: INTERNAL MEDICINE | Admitting: INTERNAL MEDICINE
Payer: COMMERCIAL

## 2024-06-12 ENCOUNTER — ANESTHESIA (OUTPATIENT)
Dept: ENDOSCOPY | Age: 64
End: 2024-06-12
Payer: COMMERCIAL

## 2024-06-12 VITALS
OXYGEN SATURATION: 95 % | BODY MASS INDEX: 32.21 KG/M2 | RESPIRATION RATE: 19 BRPM | WEIGHT: 225 LBS | HEIGHT: 70 IN | SYSTOLIC BLOOD PRESSURE: 111 MMHG | DIASTOLIC BLOOD PRESSURE: 76 MMHG | HEART RATE: 59 BPM

## 2024-06-12 DIAGNOSIS — Z12.11 SPECIAL SCREENING FOR MALIGNANT NEOPLASMS, COLON: ICD-10-CM

## 2024-06-12 PROCEDURE — 88305 TISSUE EXAM BY PATHOLOGIST: CPT | Performed by: INTERNAL MEDICINE

## 2024-06-12 PROCEDURE — 45385 COLONOSCOPY W/LESION REMOVAL: CPT | Performed by: INTERNAL MEDICINE

## 2024-06-12 PROCEDURE — 99070 SPECIAL SUPPLIES PHYS/QHP: CPT | Performed by: INTERNAL MEDICINE

## 2024-06-12 RX ORDER — NALOXONE HYDROCHLORIDE 0.4 MG/ML
0.08 INJECTION, SOLUTION INTRAMUSCULAR; INTRAVENOUS; SUBCUTANEOUS ONCE AS NEEDED
Status: DISCONTINUED | OUTPATIENT
Start: 2024-06-12 | End: 2024-06-12

## 2024-06-12 RX ORDER — SODIUM CHLORIDE, SODIUM LACTATE, POTASSIUM CHLORIDE, CALCIUM CHLORIDE 600; 310; 30; 20 MG/100ML; MG/100ML; MG/100ML; MG/100ML
INJECTION, SOLUTION INTRAVENOUS CONTINUOUS
Status: DISCONTINUED | OUTPATIENT
Start: 2024-06-12 | End: 2024-06-12

## 2024-06-12 RX ORDER — EPHEDRINE SULFATE 50 MG/ML
INJECTION INTRAVENOUS AS NEEDED
Status: DISCONTINUED | OUTPATIENT
Start: 2024-06-12 | End: 2024-06-12 | Stop reason: SURG

## 2024-06-12 RX ORDER — ONDANSETRON 2 MG/ML
4 INJECTION INTRAMUSCULAR; INTRAVENOUS ONCE AS NEEDED
Status: DISCONTINUED | OUTPATIENT
Start: 2024-06-12 | End: 2024-06-12

## 2024-06-12 RX ADMIN — EPHEDRINE SULFATE 5 MG: 50 INJECTION INTRAVENOUS at 08:01:00

## 2024-06-12 RX ADMIN — EPHEDRINE SULFATE 5 MG: 50 INJECTION INTRAVENOUS at 07:57:00

## 2024-06-12 RX ADMIN — SODIUM CHLORIDE, SODIUM LACTATE, POTASSIUM CHLORIDE, CALCIUM CHLORIDE: 600; 310; 30; 20 INJECTION, SOLUTION INTRAVENOUS at 07:30:00

## 2024-06-12 RX ADMIN — EPHEDRINE SULFATE 5 MG: 50 INJECTION INTRAVENOUS at 07:56:00

## 2024-06-12 RX ADMIN — EPHEDRINE SULFATE 5 MG: 50 INJECTION INTRAVENOUS at 07:50:00

## 2024-06-12 RX ADMIN — EPHEDRINE SULFATE 5 MG: 50 INJECTION INTRAVENOUS at 07:54:00

## 2024-06-12 RX ADMIN — SODIUM CHLORIDE, SODIUM LACTATE, POTASSIUM CHLORIDE, CALCIUM CHLORIDE: 600; 310; 30; 20 INJECTION, SOLUTION INTRAVENOUS at 07:59:00

## 2024-06-12 NOTE — ANESTHESIA PREPROCEDURE EVALUATION
Anesthesia PreOp Note    HPI:     Chapito Jackson is a 63 year old male who presents for preoperative consultation requested by: Larry Hammond MD    Date of Surgery: 2024    Procedure(s):  COLONOSCOPY  Indication: Special screening for malignant neoplasms, colon    Relevant Problems   No relevant active problems       NPO:  Last Liquid Consumption Date: 24  Last Liquid Consumption Time: 0045  Last Solid Consumption Date: 24  Last Solid Consumption Time: 0800  Last Liquid Consumption Date: 24          History Review:  Patient Active Problem List    Diagnosis Date Noted    Age-related nuclear cataract of both eyes 2023    Vitreous floaters of both eyes 2023    Single subsegmental pulmonary embolism without acute cor pulmonale (HCC) 10/15/2020    Ocular hypertension, bilateral 2019    Ptosis, right eyelid 2019    HTN (hypertension) 2015       Past Medical History:    Chest pain    normal stress nuclear    Diverticulitis    Essential hypertension    Herpes simplex virus type 1 (HSV-1) dermatitis    genital involvement - medication    High blood pressure    Lipid screening    PE (pulmonary thromboembolism) (HCC)       Past Surgical History:   Procedure Laterality Date    Colectomy  2003    partial left colon resection    Colonoscopy  2013    Electrocardiogram, complete  2013    scanned to media tab    Eyelid surgery procedure - od - right eye Right 2019    RUL- ptosis Dr. Galloway       Medications Prior to Admission   Medication Sig Dispense Refill Last Dose    aspirin 81 MG Oral Tab EC Take 1 tablet (81 mg total) by mouth daily.   2024    lisinopril 10 MG Oral Tab Take 1 tablet (10 mg total) by mouth daily. 90 tablet 1 2024    [] neomycin-polymyxin-hydrocortisone 3.5-44979-2 Otic Suspension Place 4 drops into the left ear 3 (three) times daily for 7 days. 1 each 0      Current Facility-Administered Medications Ordered in Epic    Medication Dose Route Frequency Provider Last Rate Last Admin    lactated ringers infusion   Intravenous Continuous Larry Hammond MD         No current [facility]-ordered outpatient medications on file.       No Known Allergies    Family History   Problem Relation Age of Onset    Lipids Father         hyperlipidemia    Breast Cancer Mother     Diabetes Neg     Glaucoma Neg     Macular degeneration Neg      Social History     Socioeconomic History    Marital status:    Tobacco Use    Smoking status: Never    Smokeless tobacco: Never   Vaping Use    Vaping status: Never Used   Substance and Sexual Activity    Alcohol use: Yes     Comment: beer & wine (2 drinks) weekly    Drug use: No    Sexual activity: Yes     Partners: Female   Other Topics Concern    Caffeine Concern Yes     Comment: coffee, 2 cups/day       Available pre-op labs reviewed.             Vital Signs:  Body mass index is 32.28 kg/m².   height is 1.778 m (5' 10\") and weight is 102.1 kg (225 lb). His blood pressure is 130/80 and his pulse is 63. His respiration is 12 and oxygen saturation is 95%.   Vitals:    06/04/24 1455 06/12/24 0700   BP:  130/80   Pulse:  63   Resp:  12   SpO2:  95%   Weight: 102.1 kg (225 lb)    Height: 1.778 m (5' 10\")         Anesthesia Evaluation     Patient summary reviewed and Nursing notes reviewed    No history of anesthetic complications   Airway   Mallampati: II  TM distance: >3 FB  Neck ROM: full  Dental - Dentition appears grossly intact     Pulmonary - normal exam     ROS comment: History of PE  Denies smoking or marijuana use  Cardiovascular - normal exam  Exercise tolerance: good  (+) hypertension    ROS comment: No recent chest pain    Neuro/Psych - negative ROS     GI/Hepatic/Renal    (+) bowel prep    Comments: + history of diverticulitis, s/p left partial colon resection    Endo/Other - negative ROS   Abdominal   (+) obese                 Anesthesia Plan:   ASA:  2  Plan:   MAC  Post-op Pain Management:  IV analgesics  Informed Consent Plan and Risks Discussed With:  Patient  Discussed plan with:  Surgeon      I have informed Chapito IRVIN cashterry and/or legal guardian or family member of the nature of the anesthetic plan, benefits, risks including possible dental damage if relevant, major complications, and any alternative forms of anesthetic management.   All of the patient's questions were answered to the best of my ability. The patient desires the anesthetic management as planned.  Sara Sesay MD  6/12/2024 7:26 AM  Present on Admission:  **None**

## 2024-06-12 NOTE — OPERATIVE REPORT
[facility] Endoscopy Report      Date of Procedure:  06/12/24      Preoperative Diagnosis:  Colorectal cancer screening      Postoperative Diagnosis:  1.  Colon polyps  2.  Residual diverticulosis      Procedure:    Colonoscopy with polypectomy      Surgeon:  Larry Hammond M.D.      Anesthesia:  Monitored anesthesia care  Cecal withdrawal time: 21 minutes  EBL:  Insignificant      Brief History:  This is a 63 year old male who presents for a screening colonoscopy.  His last examination is approaching 11 years prior.  He has a previous history of recurrent diverticulitis post a left colon resection.  He is asymptomatic from a lower gastrointestinal tract standpoint.      Technique:  After informed consent, the patient was placed in the left lateral recumbent position.  Digital rectal examination revealed no palpable intraluminal abnormalities.  An Olympus variable stiffness 190 series HD colonoscope was inserted into the rectum and advanced under direct vision by following the lumen to the terminal ileum.  The colon was examined upon withdrawal in the left lateral recumbent position.      Findings:  The preparation of the colon was good.  The terminal ileum was examined for 5 cm and visually normal.  The ileocecal valve was well preserved. The visualized colonic mucosa from the cecum to the anal verge was normal with an intact vascular pattern.  In the ascending colon there were #3 polyps measuring 3-4 mm in size.  These were cold snare excised and retrieved.  No ongoing bleeding.  Scattered residual diverticulosis was seen throughout the colon without current signs of complication.  At 18 cm upon withdrawal there was a wide open colocolonic anastomosis.  There were no other colonic polyps, mass lesions, vascular anomalies or signs of inflammation seen.  Retroflexion in the rectum revealed no abnormalities.  The procedure was well tolerated without immediate complication.      Impression:  1.   Diminutive right-sided colon polyps  2.  Residual uncomplicated diverticulosis  3.  Normal colocolonic anastomosis    Recommendations:  1.  High-fiber diet.  2.  Follow-up biopsy results.  Polyp histology to determine recommendations regarding follow-up.        Larry Hammond MD  6/12/2024  8:01 AM

## 2024-06-12 NOTE — ANESTHESIA POSTPROCEDURE EVALUATION
Patient: Chapito Jackson    Procedure Summary       Date: 06/12/24 Room / Location: [facility] ENDOSCOPY 01 / [facility] ENDO    Anesthesia Start: 0729 Anesthesia Stop: 0805    Procedure: COLONOSCOPY Diagnosis:       Special screening for malignant neoplasms, colon      (polyps and diverticulosis)    Surgeons: Larry Hammond MD Anesthesiologist: Sara Sesay MD    Anesthesia Type: MAC ASA Status: 2            Anesthesia Type: MAC    Vitals Value Taken Time   /67 06/12/24 0804   Temp  06/12/24 0805   Pulse 68 06/12/24 0804   Resp 20 06/12/24 0804   SpO2 92 % 06/12/24 0804   Vitals shown include unfiled device data.    [facility] AN Post Evaluation:   Patient Evaluated in PACU  Patient Participation: waiting for patient participation  Level of Consciousness: sleepy but conscious  Pain Management: adequate  Airway Patency:patent  Dental exam unchanged from preop  Yes    Nausea/Vomiting: none  Cardiovascular Status: acceptable and hemodynamically stable  Respiratory Status: acceptable, nasal cannula, nonlabored ventilation and spontaneous ventilation  Postoperative Hydration acceptable      Sara Sesay MD  6/12/2024 8:05 AM

## 2024-06-12 NOTE — H&P
History & Physical Examination    Patient Name: Chapito Jackson  MRN: [id number]  CSN: [id number]  YOB: 1960    Diagnosis: Colorectal cancer screening      Medications Prior to Admission   Medication Sig Dispense Refill Last Dose    aspirin 81 MG Oral Tab EC Take 1 tablet (81 mg total) by mouth daily.   2024    lisinopril 10 MG Oral Tab Take 1 tablet (10 mg total) by mouth daily. 90 tablet 1 2024    [] neomycin-polymyxin-hydrocortisone 3.5-16207-1 Otic Suspension Place 4 drops into the left ear 3 (three) times daily for 7 days. 1 each 0      Current Facility-Administered Medications   Medication Dose Route Frequency    lactated ringers infusion   Intravenous Continuous       Allergies: No Known Allergies    Past Medical History:    Chest pain    normal stress nuclear    Diverticulitis    Essential hypertension    Herpes simplex virus type 1 (HSV-1) dermatitis    genital involvement - medication    High blood pressure    Lipid screening    PE (pulmonary thromboembolism) (HCC)     Past Surgical History:   Procedure Laterality Date    Colectomy  2003    partial left colon resection    Colonoscopy  2013    Electrocardiogram, complete  2013    scanned to media tab    Eyelid surgery procedure - od - right eye Right 2019    RUL- ptosis Dr. Galloway     Family History   Problem Relation Age of Onset    Lipids Father         hyperlipidemia    Breast Cancer Mother     Diabetes Neg     Glaucoma Neg     Macular degeneration Neg      Social History     Tobacco Use    Smoking status: Never    Smokeless tobacco: Never   Substance Use Topics    Alcohol use: Yes     Comment: beer & wine (2 drinks) weekly       SYSTEM Check if Review is Normal Check if Physical Exam is Normal If not normal, please explain:   HEENT [X ] [ X]    NECK  [X ] [ X]    HEART [X ] [ X]    LUNGS [X ] [ X]    ABDOMEN [X ] [ X]    EXTREMITIES [X ] [ X]    OTHER        [ x ] I have discussed the risks and benefits  and alternatives with the patient/family.  They understand and agree to proceed with plan of care.  [ x ] I have reviewed the History and Physical done within the last 30 days.  Any changes noted above.    Larry Hammond MD  6/12/2024  7:10 AM

## 2024-06-12 NOTE — DISCHARGE INSTRUCTIONS
Home Care Instructions for Colonoscopy with Sedation    Diet:  - Resume your regular diet as tolerated unless otherwise instructed.  - Start with light meals to minimize bloating.  - Do not drink alcohol today.    Medication:  - If you have questions about resuming your normal medications, please contact your Primary Care Physician.    Activities:  - Take it easy today. Do not return to work today.  - Do not drive today.  - Do not operate any machinery today (including kitchen equipment).    Colonoscopy:  - You may notice some rectal \"spotting\" (a little blood on the toilet tissue) for a day or two after the exam. This is normal.  - If you experience any rectal bleeding (not spotting), persistent tenderness or sharp severe abdominal pains, oral temperature over 100 degrees Fahrenheit, light-headedness or dizziness, or any other problems, contact your doctor.    **If unable to reach your doctor, please go to the [facility] Emergency Room**    - Your referring physician will receive a full report of your examination.  - If you do not hear from your doctor's office within two weeks of your biopsy, please call them for your results.    You may be able to see your laboratory results in [facility] between 4 and 7 business days.  In some cases, your physician may not have viewed the results before they are released to [employer].  If you have questions regarding your results contact the physician who ordered the test/exam by phone or via [employer] by choosing \"Ask a Medical Question.\"

## 2024-06-14 ENCOUNTER — TELEPHONE (OUTPATIENT)
Facility: CLINIC | Age: 64
End: 2024-06-14

## 2024-06-14 NOTE — TELEPHONE ENCOUNTER
3  year colonoscopy recall entered. Health maintenance updated.    Colonoscopy done on 6/12/24 and next due on 6/12/27.

## 2024-06-14 NOTE — TELEPHONE ENCOUNTER
----- Message from Larry Hammond sent at 6/13/2024  4:53 PM CDT -----  I spoke to Adithya.  He is feeling well.  He had #3 subcentimeter tubular adenomas removed.  I discussed the significance.  Residual diverticulosis was present.  I have recommended a high-fiber diet for diverticulosis and a surveillance colonoscopy in 3 years.    GI RNs: Please enter colonoscopy recall for 3 years.

## 2024-07-29 ENCOUNTER — PATIENT MESSAGE (OUTPATIENT)
Dept: DERMATOLOGY CLINIC | Facility: CLINIC | Age: 64
End: 2024-07-29

## 2024-07-29 ENCOUNTER — OFFICE VISIT (OUTPATIENT)
Dept: DERMATOLOGY CLINIC | Facility: CLINIC | Age: 64
End: 2024-07-29
Payer: COMMERCIAL

## 2024-07-29 DIAGNOSIS — L82.1 SEBORRHEIC KERATOSES: ICD-10-CM

## 2024-07-29 DIAGNOSIS — L81.4 LENTIGINES: ICD-10-CM

## 2024-07-29 DIAGNOSIS — L57.0 ACTINIC KERATOSIS: Primary | ICD-10-CM

## 2024-07-29 DIAGNOSIS — D18.01 CHERRY ANGIOMA: ICD-10-CM

## 2024-07-29 DIAGNOSIS — D22.9 MULTIPLE BENIGN NEVI: ICD-10-CM

## 2024-07-29 NOTE — PROGRESS NOTES
July 29, 2024    New patient     CHIEF COMPLAINT: FBSE    HISTORY OF PRESENT ILLNESS: .    1. Lesion  Location: right shoulder   Duration: over 1 year  Signs and symptoms: none  Current treatment: none  Past treatments: none    DERM HISTORY:  History of skin cancer: No    FAMILY HISTORY:  History of melanoma: No    PAST MEDICAL HISTORY:  Past Medical History:    Chest pain    normal stress nuclear    Diverticulitis    Essential hypertension    Herpes simplex virus type 1 (HSV-1) dermatitis    genital involvement - medication    High blood pressure    Lipid screening    PE (pulmonary thromboembolism) ([facility])       REVIEW OF SYSTEMS:  Constitutional: Denies fever, chills, unintentional weight loss.   Skin as per HPI    Medications  Current Outpatient Medications   Medication Sig Dispense Refill    lisinopril 10 MG Oral Tab Take 1 tablet (10 mg total) by mouth daily. 90 tablet 1    aspirin 81 MG Oral Tab EC Take 1 tablet (81 mg total) by mouth daily.         PHYSICAL EXAM:  Patient declined chaperone   General: awake, alert, no acute distress  Skin: Skin exam was performed today including the following: head and face, scalp, neck, chest (including breasts and axillae), abdomen, back, bilateral upper extremities, bilateral lower extremities, hands, feet, digits, nails. Pertinent findings include:   - Scattered bright red-purple dome-shaped papules on the trunk and extremities   - Scattered light brown stellate macules on sun exposed sites  - Scattered, evenly colored, round brown macules and papules with regular borders on the trunk and extremities  - Numerous scattered skin-colored and brown, waxy, stuck-on papules and plaques on the trunk and extremities  - L temple with a pink brown gritty papule    ASSESSMENT & PLAN:  Pathophysiology of diagnoses discussed with patient.  Therapeutic options reviewed. Risks, benefits, and alternatives discussed with patient. Instructions reviewed at length.    #Lentigines  #Seborrheic  keratoses   #Cherry angiomas   - Reassurance provided regarding the benign nature of these lesions.    #Multiple benign nevi  - Complete skin exam performed today with no outlier lesions identified   - Reassured patient of benign nature of these lesions.   - Recommend daily photoprotection with broad-spectrum sunscreen, avoidance of sun during peak hours, and sun protective clothing.    - Dermoscopy was used for physical examination of pigmented lesions during today's office visit.    #Actinic Keratosis  - Discussed premalignant etiology and possibility of transformation to SCC  - Recommended cryotherapy today   - Discussed side effects including redness, swelling, crusting, and discolortion after treatment, wound care with soap/water and vaseline     - Procedure Note Cryosurgery of pre-malignant lesion(s)  Risks, benefits, alternatives, complications, and personnel required for cryosurgery reviewed with patient. Patient verbalizes understanding and wishes to proceed.   - Cryosurgery performed with Liquid Nitrogen via cryostat spray gun to Actinic Keratosis . 1 lesion(s) treated.   - Patient tolerated well and wound care discussed. Return if lesions fail to fully resolve.      Return to clinic: 1 year  or sooner if something concerning arises     Chapito Bragg MD

## 2024-07-30 NOTE — TELEPHONE ENCOUNTER
LOV 7/29/24 - Please see message from pt.  Unable to locate any recommendations in the office visit notes.  Thank you.

## 2024-08-01 NOTE — TELEPHONE ENCOUNTER
Please clarify if it is for moisturizing the body or for something different. I typically recommend Eucerin Advanced repain as a full body moisturizer.     Chapito Bragg MD

## 2024-10-01 ENCOUNTER — IMMUNIZATION (OUTPATIENT)
Dept: LAB | Age: 64
End: 2024-10-01
Attending: EMERGENCY MEDICINE
Payer: COMMERCIAL

## 2024-10-01 DIAGNOSIS — Z23 NEED FOR VACCINATION: Primary | ICD-10-CM

## 2024-10-01 PROCEDURE — 90656 IIV3 VACC NO PRSV 0.5 ML IM: CPT

## 2024-10-01 PROCEDURE — 90471 IMMUNIZATION ADMIN: CPT

## 2024-12-03 RX ORDER — LISINOPRIL 10 MG/1
10 TABLET ORAL DAILY
Qty: 90 TABLET | Refills: 0 | Status: SHIPPED | OUTPATIENT
Start: 2024-12-03

## 2024-12-03 NOTE — TELEPHONE ENCOUNTER
Please Review. Protocol Failed; No Protocol   Future Appointments   Date Time Provider Department Center   1/13/2025  9:40 AM [facility]       Requested Prescriptions   Pending Prescriptions Disp Refills    LISINOPRIL 10 MG Oral Tab [Pharmacy Med Name: Lisinopril 10 Mg Tab Lupi] 90 tablet 0     Sig: Take 1 tablet by mouth daily.       Hypertension Medications Protocol Failed - 12/3/2024  4:10 PM        Failed - CMP or BMP in past 12 months        Failed - EGFRCR or GFRNAA > 50     GFR Evaluation            Passed - Last BP reading less than 140/90     BP Readings from Last 1 Encounters:   06/12/24 111/76               Passed - In person appointment or virtual visit in the past 12 mos or appointment in next 3 mos     Recent Outpatient Visits              4 months ago Actinic keratosis    [facility] [place] Chapito Bragg MD    Office Visit    7 months ago Abrasion of left ear canal with infection, initial encounter    [facility] Rachael Cordoba APRN    Office Visit    1 year ago Routine physical examination    [facility] Vic Faulkner MD    Office Visit    1 year ago Age-related nuclear cataract of both eyes    [facility] Pablo Cancino MD    Office Visit    1 year ago Right acute serous otitis media, recurrence not specified    [facility]areth Calles Jr[facility] APN    Office Visit          Future Appointments         Provider Department Appt Notes    In 1 month Vic Faulkner MD [facility] Annual check  lst px 11/15/23 patient advised too soon [facility] 7/12/24                           Future Appointments         Provider Department Appt Notes    In 1 month Vic Faulkner MD [facility] Annual check  lst px 11/15/23 patient advised too soon [facility] 7/12/24          Recent Outpatient Visits              4 months ago Actinic keratosis    [facility] [place] Michalik, Daniel, MD    Office Visit    7 months ago Abrasion of left ear canal with infection, initial encounter    [facility] Rachael Cordoba APRN    Office Visit    1 year ago [facility] physical [facility]    Office Visit    1 year ago Age-related nuclear cataract of both eyes    [facility] Pablo Cancino MD    Office Visit    1 year ago Right acute serous otitis media, recurrence not specified    [facility] AP[facility]    Office Visit

## 2024-12-14 ENCOUNTER — OFFICE VISIT (OUTPATIENT)
Dept: FAMILY MEDICINE CLINIC | Facility: CLINIC | Age: 64
End: 2024-12-14
Payer: COMMERCIAL

## 2024-12-14 VITALS
SYSTOLIC BLOOD PRESSURE: 129 MMHG | WEIGHT: 226.63 LBS | RESPIRATION RATE: 17 BRPM | BODY MASS INDEX: 32.45 KG/M2 | HEIGHT: 70 IN | OXYGEN SATURATION: 95 % | TEMPERATURE: 100 F | HEART RATE: 101 BPM | DIASTOLIC BLOOD PRESSURE: 87 MMHG

## 2024-12-14 DIAGNOSIS — J02.9 PHARYNGITIS, UNSPECIFIED ETIOLOGY: ICD-10-CM

## 2024-12-14 DIAGNOSIS — J02.9 SORE THROAT: Primary | ICD-10-CM

## 2024-12-14 LAB
CONTROL LINE PRESENT WITH A CLEAR BACKGROUND (YES/NO): YES YES/NO
KIT LOT #: NORMAL NUMERIC
STREP GRP A CUL-SCR: NEGATIVE

## 2024-12-14 PROCEDURE — 87880 STREP A ASSAY W/OPTIC: CPT | Performed by: NURSE PRACTITIONER

## 2024-12-14 PROCEDURE — 99213 OFFICE O/P EST LOW 20 MIN: CPT | Performed by: NURSE PRACTITIONER

## 2024-12-14 PROCEDURE — 3079F DIAST BP 80-89 MM HG: CPT | Performed by: NURSE PRACTITIONER

## 2024-12-14 PROCEDURE — 3008F BODY MASS INDEX DOCD: CPT | Performed by: NURSE PRACTITIONER

## 2024-12-14 PROCEDURE — 3074F SYST BP LT 130 MM HG: CPT | Performed by: NURSE PRACTITIONER

## 2024-12-14 PROCEDURE — 87081 CULTURE SCREEN ONLY: CPT | Performed by: NURSE PRACTITIONER

## 2024-12-14 NOTE — PROGRESS NOTES
CHIEF COMPLAINT:     Chief Complaint   Patient presents with    Sore Throat     Sore throat started yesterday, nasal drip, runny nose, congestion. X2 days so far.        HPI:   Chapito Jackson is a 64 year old male presents to clinic with symptoms of sore throat. Patient has had for 2 days. Pt reports congestion, post nasal drip, and runny nose. Painful swallowing. Symptoms have been worsening since onset.  Patient reports following associated symptoms: sore throat, congestion. Patient denies headache. Patient denies stomach upset. Patient denies rash.  Patient denies history of strep. Patient denies strep pharyngitis exposure.  Treating symptoms with: Nyquil and dayquil, helps minimally. Did a COVID test at home that was negative. Received COVID vaccines. Denies fever and chills.       Current Outpatient Medications   Medication Sig Dispense Refill    lisinopril 10 MG Oral Tab Take 1 tablet (10 mg total) by mouth daily. 90 tablet 0    aspirin 81 MG Oral Tab EC Take 1 tablet (81 mg total) by mouth daily.        Past Medical History:    Chest pain    normal stress nuclear    Diverticulitis    Essential hypertension    Herpes simplex virus type 1 (HSV-1) dermatitis    genital involvement - medication    High blood pressure    Lipid screening    PE (pulmonary thromboembolism) (HCC)      Social History:  Social History     Socioeconomic History    Marital status:    Tobacco Use    Smoking status: Never    Smokeless tobacco: Never   Vaping Use    Vaping status: Never Used   Substance and Sexual Activity    Alcohol use: Yes     Comment: beer & wine (2 drinks) weekly    Drug use: No    Sexual activity: Yes     Partners: Female   Other Topics Concern    Caffeine Concern Yes     Comment: coffee, 2 cups/day    Grew up on a farm No    History of tanning Yes    Outdoor occupation Yes     Comment: as a child    Reaction to local anesthetic No    Pt has a pacemaker No    Pt has a defibrillator No        REVIEW OF SYSTEMS:    Review of Systems   Constitutional:  Negative for chills and fever.   HENT:  Positive for congestion, rhinorrhea and sore throat. Negative for ear pain.    Eyes:  Negative for discharge and redness.   Respiratory:  Negative for cough.    Skin:  Negative for rash.   Neurological:  Negative for headaches.   All other systems reviewed and are negative.        EXAM:   /87 (BP Location: Left arm, Patient Position: Sitting, Cuff Size: adult)   Pulse 101   Temp 99.5 °F (37.5 °C) (Temporal)   Resp 17   Ht 5' 10\" (1.778 m)   Wt 226 lb 9.6 oz (102.8 kg)   SpO2 95%   BMI 32.51 kg/m²   Physical Exam  Vitals and nursing note reviewed.   Constitutional:       Appearance: Normal appearance. He is not ill-appearing.   HENT:      Head: Normocephalic and atraumatic.      Right Ear: Hearing, tympanic membrane, ear canal and external ear normal. No drainage. There is no impacted cerumen. Tympanic membrane is not injected, perforated, erythematous or bulging.      Left Ear: Hearing, tympanic membrane, ear canal and external ear normal. No drainage. There is no impacted cerumen. Tympanic membrane is not injected, perforated, erythematous or bulging.      Nose: Mucosal edema and rhinorrhea present. No congestion.      Right Sinus: No maxillary sinus tenderness or frontal sinus tenderness.      Left Sinus: No maxillary sinus tenderness or frontal sinus tenderness.      Mouth/Throat:      Lips: No lesions.      Mouth: Mucous membranes are moist. No oral lesions.      Palate: No lesions.      Pharynx: Oropharynx is clear. Uvula midline. Posterior oropharyngeal erythema present. No oropharyngeal exudate.      Tonsils: No tonsillar exudate or tonsillar abscesses. 1+ on the right. 1+ on the left.   Eyes:      General: No scleral icterus.        Right eye: No discharge.         Left eye: No discharge.      Conjunctiva/sclera: Conjunctivae normal.   Cardiovascular:      Rate and Rhythm: Normal rate and regular rhythm.      Heart  sounds: Normal heart sounds. No murmur heard.  Pulmonary:      Effort: Pulmonary effort is normal. No tachypnea, accessory muscle usage, respiratory distress or retractions.      Breath sounds: Normal breath sounds. No decreased breath sounds, wheezing, rhonchi or rales.   Lymphadenopathy:      Cervical: No cervical adenopathy.      Right cervical: No superficial or posterior cervical adenopathy.     Left cervical: No superficial or posterior cervical adenopathy.   Skin:     General: Skin is warm and dry.   Neurological:      Mental Status: He is alert and oriented to person, place, and time.   Psychiatric:         Mood and Affect: Mood normal.         Behavior: Behavior normal.              ASSESSMENT AND PLAN:   Assessment:   Encounter Diagnoses   Name Primary?    Sore throat Yes    Pharyngitis, unspecified etiology          Plan: Rapid strep test negative in clinic today, will send throat culture and follow with results and recommendations. Comfort Measures discussed and listed in Patient Instructions.  Commend starting daily Flonase and Zyrtec to reduce postnasal drip that may be contributing to sore throat.    Requested Prescriptions      No prescriptions requested or ordered in this encounter       Risks, benefits, complications and side effects of meds discussed with patient.     OTC Tylenol/Motrin prn.   Push fluids- warm or cool liquids, whichever is soothing for patient  If treated with antibiotics, change tooth brush after on medication for 48 hours.   Warm salt water gargles 2 times per day for at least 3 days.    Do not share utensils or drinks with anyone.    Follow up with PCP if not improving, condition worsens, or fever greater than or equal to 100.4 persists for 72 hours.      The patient/parent indicates understanding of these issues and agrees to the plan.  The patient is asked to follow up with their PCP prn.

## 2025-01-13 ENCOUNTER — OFFICE VISIT (OUTPATIENT)
Facility: CLINIC | Age: 65
End: 2025-01-13

## 2025-01-13 VITALS
BODY MASS INDEX: 32.21 KG/M2 | TEMPERATURE: 98 F | RESPIRATION RATE: 18 BRPM | HEART RATE: 74 BPM | WEIGHT: 225 LBS | SYSTOLIC BLOOD PRESSURE: 118 MMHG | DIASTOLIC BLOOD PRESSURE: 78 MMHG | HEIGHT: 70 IN

## 2025-01-13 DIAGNOSIS — Z86.711 HISTORY OF PULMONARY EMBOLISM: ICD-10-CM

## 2025-01-13 DIAGNOSIS — Z00.00 ROUTINE PHYSICAL EXAMINATION: Primary | ICD-10-CM

## 2025-01-13 DIAGNOSIS — I10 ESSENTIAL HYPERTENSION WITH GOAL BLOOD PRESSURE LESS THAN 140/90: ICD-10-CM

## 2025-01-13 PROBLEM — I26.93 SINGLE SUBSEGMENTAL PULMONARY EMBOLISM WITHOUT ACUTE COR PULMONALE (HCC): Status: RESOLVED | Noted: 2020-10-15 | Resolved: 2025-01-13

## 2025-01-13 RX ORDER — ELECTROLYTES/DEXTROSE
SOLUTION, ORAL ORAL DAILY
COMMUNITY

## 2025-01-13 NOTE — PROGRESS NOTES
HPI:    Patient ID: Chapito Jackson is a 64 year old male.    HPI    Patient returns to the office today requesting general physical exam as well as to discuss chronic medical issues as listed on the active problem list below.  Patient last seen in the office by me on November 2023 for physical exam.  During the last visit, the following changes were made: None.  Since the last visit, the patient has seen the following doctors: Dermatology and walk-in clinic.  History of pulmonary embolism x 1 back in 2020 during times of COVID when he was found incidentally to have a PE on CT scan.  No symptoms or evidence of recurrence.  He has not had any prescription blood thinners recently.    Today, the patient offers the following complaints: overall, he is doing good. He gets lightheaded if he stands up too quickly; lasts for a few seconds; going on for 3-4 months. No other major changes. Does not check BP at home.   Patient describes diet as same. He does not eat as late.   For exercise, the patient does a brisk walk for about an hour.   Tobacco and alcohol use reviewed. Drinks socially.   Current medications reviewed.   Health maintenance issues reviewed.    Wt Readings from Last 6 Encounters:   01/13/25 225 lb (102.1 kg)   12/14/24 226 lb 9.6 oz (102.8 kg)   06/04/24 225 lb (102.1 kg)   04/27/24 227 lb (103 kg)   11/15/23 222 lb (100.7 kg)   01/20/23 226 lb (102.5 kg)       Patient Active Problem List   Diagnosis    HTN (hypertension)    Ptosis, right eyelid    Ocular hypertension, bilateral    Single subsegmental pulmonary embolism without acute cor pulmonale (HCC)    Age-related nuclear cataract of both eyes    Vitreous floaters of both eyes    Chest pain    Disorder of male genital organs    Dysuria    Elevated blood pressure reading    Esophageal reflux        HISTORY:  Past Medical History:    Chest pain    normal stress nuclear    Diverticulitis    Essential hypertension    Herpes simplex virus type 1 (HSV-1)  dermatitis    genital involvement - medication    High blood pressure    Lipid screening    PE (pulmonary thromboembolism) (HCC)      Past Surgical History:   Procedure Laterality Date    Colectomy  01/01/2003    partial left colon resection    Colonoscopy  08/14/2013    Colonoscopy N/A 06/12/2024    Procedure: COLONOSCOPY;  Surgeon: Larry Hammond MD;  Location: [facility] ENDO    Electrocardiogram, complete  03/22/2013    scanned to media tab    Eyelid surgery procedure - od - right eye Right 2019    RUL- ptosis Dr. Galloway      Family History   Problem Relation Age of Onset    Lipids Father         hyperlipidemia    Skin cancer Father     Breast Cancer Mother     Diabetes Neg     Glaucoma Neg     Macular degeneration Neg       Social History     Socioeconomic History    Marital status:    Tobacco Use    Smoking status: Never    Smokeless tobacco: Never   Vaping Use    Vaping status: Never Used   Substance and Sexual Activity    Alcohol use: Yes     Comment: beer & wine (2 drinks) weekly    Drug use: No    Sexual activity: Yes     Partners: Female   Other Topics Concern    Caffeine Concern Yes     Comment: coffee, 2 cups/day    Grew up on a farm No    History of tanning Yes    Outdoor occupation Yes     Comment: as a child    Reaction to local anesthetic No    Pt has a pacemaker No    Pt has a defibrillator No          Review of Systems          Current Outpatient Medications   Medication Sig Dispense Refill    Multiple Vitamin (MULTIVITAMIN ADULT) Oral Tab Take by mouth daily.      lisinopril 10 MG Oral Tab Take 1 tablet (10 mg total) by mouth daily. 90 tablet 0    aspirin 81 MG Oral Tab EC Take 1 tablet (81 mg total) by mouth daily.       Allergies:Allergies[1]     PHYSICAL EXAM:   /78 (BP Location: Left arm, Patient Position: Sitting, Cuff Size: large)   Pulse 74   Temp 98.2 °F (36.8 °C) (Other)   Resp 18   Ht 5' 10\" (1.778 m)   Wt 225 lb (102.1 kg)   BMI 32.28 kg/m²      Physical  Exam  Constitutional:       Appearance: Normal appearance. He is well-developed.   HENT:      Right Ear: Tympanic membrane and ear canal normal.      Left Ear: Tympanic membrane and ear canal normal.      Nose: Nose normal.      Mouth/Throat:      Pharynx: No oropharyngeal exudate or posterior oropharyngeal erythema.   Eyes:      Conjunctiva/sclera: Conjunctivae normal.      Pupils: Pupils are equal, round, and reactive to light.   Neck:      Thyroid: No thyromegaly.      Vascular: No carotid bruit.   Cardiovascular:      Rate and Rhythm: Normal rate and regular rhythm.      Pulses: Normal pulses.      Heart sounds: Normal heart sounds. No murmur heard.  Pulmonary:      Effort: Pulmonary effort is normal.      Breath sounds: Normal breath sounds. No wheezing or rales.   Abdominal:      General: Bowel sounds are normal.      Palpations: Abdomen is soft. There is no mass.      Tenderness: There is no abdominal tenderness.   Genitourinary:     Penis: Normal.       Testes: Normal.   Musculoskeletal:      Right lower leg: No edema.      Left lower leg: No edema.   Lymphadenopathy:      Cervical: No cervical adenopathy.   Skin:     General: Skin is warm and dry.      Findings: No rash.   Neurological:      General: No focal deficit present.      Mental Status: He is alert.      Cranial Nerves: No cranial nerve deficit.      Coordination: Coordination normal.   Psychiatric:         Mood and Affect: Mood normal.         Behavior: Behavior normal.         Thought Content: Thought content normal.         Judgment: Judgment normal.                 ASSESSMENT/PLAN:   1. Routine physical examination physical exam is remarkable for overweight status.  Active issues as below.  Health maintenance issues reviewed.  Will check fasting blood work and contact patient with results.  Vaccination status reviewed.  Patient declines pneumonia shot today.  Advised he is due for pneumonia and COVID shots.  If blood work is unremarkable, the  follow-up in 1 year.     - PSA Total, Screen; Future  - CBC With Differential With Platelet; Future  - Comp Metabolic Panel (14); Future  - Lipid Panel; Future  - TSH W Reflex To Free T4; Future  - Hemoglobin A1C; Future    2. Essential hypertension with goal blood pressure less than 140/90  Blood pressure is well-controlled.  He is having some mild symptoms of orthostatic hypotension.  Nothing serious.  He will check his blood pressure at home and contact us with results.  Continue lisinopril 10 mg a day.  Encouraged to take his time when moving from a laying or sitting position up to a standing position.    3. History of pulmonary embolism  History of pulmonary embolism found incidentally back in 2020.  No evidence of recurrence.  No need for prescription anticoagulation.         Meds This Visit:  Requested Prescriptions      No prescriptions requested or ordered in this encounter       Imaging & Referrals:  None         Vic Faulkner MD        [1] No Known Allergies

## 2025-01-14 ENCOUNTER — LAB ENCOUNTER (OUTPATIENT)
Dept: LAB | Facility: HOSPITAL | Age: 65
End: 2025-01-14
Attending: INTERNAL MEDICINE
Payer: COMMERCIAL

## 2025-01-14 DIAGNOSIS — Z00.00 ROUTINE PHYSICAL EXAMINATION: ICD-10-CM

## 2025-01-14 LAB
ALBUMIN SERPL-MCNC: 4.8 G/DL (ref 3.2–4.8)
ALBUMIN/GLOB SERPL: 1.6 {RATIO} (ref 1–2)
ALP LIVER SERPL-CCNC: 54 U/L
ALT SERPL-CCNC: 30 U/L
ANION GAP SERPL CALC-SCNC: 7 MMOL/L (ref 0–18)
AST SERPL-CCNC: 26 U/L (ref ?–34)
BASOPHILS # BLD AUTO: 0.11 X10(3) UL (ref 0–0.2)
BASOPHILS NFR BLD AUTO: 1.5 %
BILIRUB SERPL-MCNC: 0.4 MG/DL (ref 0.2–1.1)
BUN BLD-MCNC: 20 MG/DL (ref 9–23)
BUN/CREAT SERPL: 15.9 (ref 10–20)
CALCIUM BLD-MCNC: 10.1 MG/DL (ref 8.7–10.4)
CHLORIDE SERPL-SCNC: 103 MMOL/L (ref 98–112)
CHOLEST SERPL-MCNC: 194 MG/DL (ref ?–200)
CO2 SERPL-SCNC: 30 MMOL/L (ref 21–32)
COMPLEXED PSA SERPL-MCNC: 1.78 NG/ML (ref ?–4)
CREAT BLD-MCNC: 1.26 MG/DL
DEPRECATED RDW RBC AUTO: 40.4 FL (ref 35.1–46.3)
EGFRCR SERPLBLD CKD-EPI 2021: 64 ML/MIN/1.73M2 (ref 60–?)
EOSINOPHIL # BLD AUTO: 0.21 X10(3) UL (ref 0–0.7)
EOSINOPHIL NFR BLD AUTO: 2.9 %
ERYTHROCYTE [DISTWIDTH] IN BLOOD BY AUTOMATED COUNT: 12.5 % (ref 11–15)
EST. AVERAGE GLUCOSE BLD GHB EST-MCNC: 140 MG/DL (ref 68–126)
FASTING PATIENT LIPID ANSWER: YES
FASTING STATUS PATIENT QL REPORTED: YES
GLOBULIN PLAS-MCNC: 3 G/DL (ref 2–3.5)
GLUCOSE BLD-MCNC: 127 MG/DL (ref 70–99)
HBA1C MFR BLD: 6.5 % (ref ?–5.7)
HCT VFR BLD AUTO: 43.4 %
HDLC SERPL-MCNC: 52 MG/DL (ref 40–59)
HGB BLD-MCNC: 15.3 G/DL
IMM GRANULOCYTES # BLD AUTO: 0.04 X10(3) UL (ref 0–1)
IMM GRANULOCYTES NFR BLD: 0.5 %
LDLC SERPL CALC-MCNC: 132 MG/DL (ref ?–100)
LYMPHOCYTES # BLD AUTO: 1.62 X10(3) UL (ref 1–4)
LYMPHOCYTES NFR BLD AUTO: 22.3 %
MCH RBC QN AUTO: 31.2 PG (ref 26–34)
MCHC RBC AUTO-ENTMCNC: 35.3 G/DL (ref 31–37)
MCV RBC AUTO: 88.4 FL
MONOCYTES # BLD AUTO: 0.84 X10(3) UL (ref 0.1–1)
MONOCYTES NFR BLD AUTO: 11.5 %
NEUTROPHILS # BLD AUTO: 4.46 X10 (3) UL (ref 1.5–7.7)
NEUTROPHILS # BLD AUTO: 4.46 X10(3) UL (ref 1.5–7.7)
NEUTROPHILS NFR BLD AUTO: 61.3 %
NONHDLC SERPL-MCNC: 142 MG/DL (ref ?–130)
OSMOLALITY SERPL CALC.SUM OF ELEC: 294 MOSM/KG (ref 275–295)
PLATELET # BLD AUTO: 263 10(3)UL (ref 150–450)
POTASSIUM SERPL-SCNC: 5.1 MMOL/L (ref 3.5–5.1)
PROT SERPL-MCNC: 7.8 G/DL (ref 5.7–8.2)
RBC # BLD AUTO: 4.91 X10(6)UL
SODIUM SERPL-SCNC: 140 MMOL/L (ref 136–145)
TRIGL SERPL-MCNC: 52 MG/DL (ref 30–149)
TSI SER-ACNC: 1.11 UIU/ML (ref 0.55–4.78)
VLDLC SERPL CALC-MCNC: 9 MG/DL (ref 0–30)
WBC # BLD AUTO: 7.3 X10(3) UL (ref 4–11)

## 2025-01-14 PROCEDURE — 85025 COMPLETE CBC W/AUTO DIFF WBC: CPT

## 2025-01-14 PROCEDURE — 84443 ASSAY THYROID STIM HORMONE: CPT

## 2025-01-14 PROCEDURE — 80061 LIPID PANEL: CPT

## 2025-01-14 PROCEDURE — 36415 COLL VENOUS BLD VENIPUNCTURE: CPT

## 2025-01-14 PROCEDURE — 83036 HEMOGLOBIN GLYCOSYLATED A1C: CPT

## 2025-01-14 PROCEDURE — 80053 COMPREHEN METABOLIC PANEL: CPT

## 2025-03-05 ENCOUNTER — APPOINTMENT (OUTPATIENT)
Dept: OCCUPATIONAL MEDICINE | Age: 65
End: 2025-03-05
Attending: NURSE PRACTITIONER

## 2025-03-10 RX ORDER — LISINOPRIL 10 MG/1
10 TABLET ORAL DAILY
Qty: 90 TABLET | Refills: 3 | Status: SHIPPED | OUTPATIENT
Start: 2025-03-10

## 2025-03-10 NOTE — TELEPHONE ENCOUNTER
Refill Per Protocol     Requested Prescriptions   Pending Prescriptions Disp Refills    LISINOPRIL 10 MG Oral Tab [Pharmacy Med Name: Lisinopril 10 Mg Tab Lupi] 90 tablet 0     Sig: Take 1 tablet (10 mg total) by mouth daily.       Hypertension Medications Protocol Passed - 3/10/2025  1:52 PM        Passed - CMP or BMP in past 12 months        Passed - Last BP reading less than 140/90     BP Readings from Last 1 Encounters:   01/13/25 118/78               Passed - In person appointment or virtual visit in the past 12 mos or appointment in next 3 mos     Recent Outpatient Visits              1 month ago Routine physical examination    [facility], Vic Su MD    Office Visit    2 months ago Sore throat    [facility], [facility] Sara Morrell APRN    Office Visit    7 months ago Actinic keratosis    [facility] Chapito Bragg MD    Office Visit    10 months ago Abrasion of left ear canal with infection, initial encounter    [facility], [facility], [facility] Rachael Cordoba APRN    Office Visit    1 year ago Routine physical examination    [facility], Vic Su MD    Office Visit                      Passed - EGFRCR or GFRNAA > 50     GFR Evaluation  EGFRCR: 64 , resulted on 1/14/2025          Passed - Medication is active on med list

## 2025-06-24 NOTE — ED INITIAL ASSESSMENT (HPI)
Pt had x ray and was told he had a nodule on his lungs- had CT scan today and was told to come to the ED for + PE on CT scan. Denies coughing, fevers, SOB, cp. Pt has history of irregular calcium. No

## 2025-07-28 ENCOUNTER — OFFICE VISIT (OUTPATIENT)
Dept: INTERNAL MEDICINE CLINIC | Facility: CLINIC | Age: 65
End: 2025-07-28
Payer: COMMERCIAL

## 2025-07-28 VITALS
SYSTOLIC BLOOD PRESSURE: 108 MMHG | HEART RATE: 74 BPM | HEIGHT: 70 IN | WEIGHT: 209 LBS | BODY MASS INDEX: 29.92 KG/M2 | TEMPERATURE: 98 F | DIASTOLIC BLOOD PRESSURE: 76 MMHG | RESPIRATION RATE: 18 BRPM | OXYGEN SATURATION: 97 %

## 2025-07-28 DIAGNOSIS — I49.3 FREQUENT PVCS: ICD-10-CM

## 2025-07-28 DIAGNOSIS — Z86.711 HISTORY OF PULMONARY EMBOLISM: ICD-10-CM

## 2025-07-28 DIAGNOSIS — R73.09 ELEVATED GLUCOSE: ICD-10-CM

## 2025-07-28 DIAGNOSIS — I10 ESSENTIAL HYPERTENSION WITH GOAL BLOOD PRESSURE LESS THAN 140/90: Primary | ICD-10-CM

## 2025-07-28 NOTE — PROGRESS NOTES
HPI:    Patient ID: Chapito Jackson is a 64 year old male.    HPI    Patient returns to the office today to discuss chronic medical issues as listed on the active problem list below.  Patient last seen in the office by me on January 13.  He was doing well at that time.  Full blood work was done.  A1c 6.5 and fasting blood sugar 127.  No prior history of diabetes.  During the last visit, the following changes were made: None.  Since the last visit, the patient has seen the following doctors: Cardiology for palpitations and chest discomfort.  Echocardiogram was normal.  Treadmill test showed some PVCs.  Cardiac monitor showed 14% of the PVCs.    Today, the patient offers the following complaints: 3 months ago, started with palps and chest discomfort. Evaluation as above. No treatment given. They discussed treatment and ablation. He did not do anything. Symptoms are much better now. Less palps overnight. Stress level is there and unchanged with work issues. He is still off of the caffeine; has a cup 1-2 times a week. No energy drinks. Not checking BP. No lightheadedness.   Patient describes diet as better. He eats less and has lost weight.   For exercise, the patient works out and walks on most days.   Tobacco and alcohol use reviewed.   Current medications reviewed.   Health maintenance issues reviewed.      Wt Readings from Last 6 Encounters:   07/28/25 209 lb (94.8 kg)   01/13/25 225 lb (102.1 kg)   12/14/24 226 lb 9.6 oz (102.8 kg)   06/04/24 225 lb (102.1 kg)   04/27/24 227 lb (103 kg)   11/15/23 222 lb (100.7 kg)       Problem List[1]     HISTORY:  Past Medical History[2]   Past Surgical History[3]   Family History[4]   Short Social Hx on File[5]       Review of Systems        Current Medications[6]  Allergies:Allergies[7]     PHYSICAL EXAM:   /76 (BP Location: Left arm, Patient Position: Sitting, Cuff Size: large)   Pulse 74   Temp 97.8 °F (36.6 °C) (Other)   Resp 18   Ht 5' 10\" (1.778 m)   Wt 209 lb  (94.8 kg)   SpO2 97%   BMI 29.99 kg/m²      Physical Exam  Constitutional:       Appearance: Normal appearance. He is well-developed.   HENT:      Nose: Nose normal.      Mouth/Throat:      Pharynx: No oropharyngeal exudate or posterior oropharyngeal erythema.   Eyes:      Conjunctiva/sclera: Conjunctivae normal.   Neck:      Vascular: No carotid bruit.   Cardiovascular:      Rate and Rhythm: Normal rate and regular rhythm.      Pulses: Normal pulses.      Heart sounds: Normal heart sounds. No murmur heard.  Pulmonary:      Effort: Pulmonary effort is normal.      Breath sounds: Normal breath sounds. No wheezing or rales.   Abdominal:      General: Bowel sounds are normal.      Palpations: Abdomen is soft. There is no mass.      Tenderness: There is no abdominal tenderness.   Musculoskeletal:      Right lower leg: No edema.      Left lower leg: No edema.   Lymphadenopathy:      Cervical: No cervical adenopathy.   Skin:     General: Skin is warm and dry.      Findings: No rash.   Neurological:      General: No focal deficit present.      Mental Status: He is alert.   Psychiatric:         Mood and Affect: Mood normal.         Behavior: Behavior normal.         Thought Content: Thought content normal.                 Assessment and Plan  1. Essential hypertension with goal blood pressure less than 140/90  Well-controlled.  Continue current treatment. Work on diet and exercise.  Follow-up in 6 months for physical and full blood work.  - Lipid Panel; Future    2. History of pulmonary embolism  Stable with no evidence of recurrence.  Continue low-dose aspirin.    3. Elevated glucose  Blood sugar of 127 and A1c at 6.5 6 months ago.  He has lost weight and is eating better.  Recheck numbers now.  Discussed the possibility of type 2 diabetes.  - Glucose, Serum; Future  - Hemoglobin A1C; Future    4. Frequent PVCs  Status post extensive evaluation with cardiology.  Things are better now.  He has discontinued caffeine.   Stress level is present but not too bad.  No further treatment needed at this time.      Meds This Visit:  Requested Prescriptions      No prescriptions requested or ordered in this encounter       Imaging & Referrals:  None         Vic Faulkner MD        [1]   Patient Active Problem List  Diagnosis    HTN (hypertension)    Ptosis, right eyelid    Ocular hypertension, bilateral    Age-related nuclear cataract of both eyes    Vitreous floaters of both eyes    Chest pain    Disorder of male genital organs    Esophageal reflux    History of pulmonary embolism   [2]   Past Medical History:   Chest pain    normal stress nuclear    Diverticulitis    Essential hypertension    Herpes simplex virus type 1 (HSV-1) dermatitis    genital involvement - medication    High blood pressure    Lipid screening    PE (pulmonary thromboembolism) (HCC)    Single subsegmental pulmonary embolism without acute cor pulmonale (HCC)   [3]   Past Surgical History:  Procedure Laterality Date    Colectomy  01/01/2003    partial left colon resection    Colonoscopy  08/14/2013    Colonoscopy N/A 06/12/2024    Procedure: COLONOSCOPY;  Surgeon: Larry Hammond MD;  [facility]: [facility]    Electrocardiogram, complete  03/22/2013    scanned to media tab    Eyelid surgery procedure - od - right eye Right 2019    RUL- ptosis Dr. Galloway   [4]   Family History  Problem Relation Age of Onset    Lipids Father         hyperlipidemia    Skin cancer Father     Breast Cancer Mother     Diabetes Neg     Glaucoma Neg     Macular degeneration Neg    [5]   Social History  Socioeconomic History    Marital status:    Tobacco Use    Smoking status: Never    Smokeless tobacco: Never   Vaping Use    Vaping status: Never Used   Substance and Sexual Activity    Alcohol use: Yes     Comment: beer & wine (2 drinks) weekly    Drug use: No    Sexual activity: Yes     Partners: Female   Other Topics Concern    Caffeine Concern Yes     Comment: coffee, 2 cups/day     Grew up on a farm No    History of tanning Yes    Outdoor occupation Yes     Comment: as a child    Reaction to local anesthetic No    Pt has a pacemaker No    Pt has a defibrillator No     Social Drivers of Health     Food Insecurity: No Food Insecurity (7/28/2025)    NCSS - Food Insecurity     Worried About Running Out of Food in the Last Year: No     Ran Out of Food in the Last Year: No   Transportation Needs: No Transportation Needs (7/28/2025)    NCSS - Transportation     Lack of Transportation: No   Housing Stability: Not At Risk (7/28/2025)    NCSS - Housing/Utilities     Has Housing: Yes     Worried About Losing Housing: No     Unable to Get Utilities: No   [6]   Current Outpatient Medications   Medication Sig Dispense Refill    lisinopril 10 MG Oral Tab Take 1 tablet (10 mg total) by mouth daily. 90 tablet 3    Multiple Vitamin (MULTIVITAMIN ADULT) Oral Tab Take by mouth daily.      aspirin 81 MG Oral Tab EC Take 1 tablet (81 mg total) by mouth daily.     [7] No Known Allergies

## 2025-07-29 ENCOUNTER — LAB ENCOUNTER (OUTPATIENT)
Dept: LAB | Facility: HOSPITAL | Age: 65
End: 2025-07-29
Attending: INTERNAL MEDICINE

## 2025-07-29 DIAGNOSIS — I10 ESSENTIAL HYPERTENSION WITH GOAL BLOOD PRESSURE LESS THAN 140/90: ICD-10-CM

## 2025-07-29 DIAGNOSIS — R73.09 ELEVATED GLUCOSE: ICD-10-CM

## 2025-07-29 LAB
CHOLEST SERPL-MCNC: 195 MG/DL (ref ?–200)
EST. AVERAGE GLUCOSE BLD GHB EST-MCNC: 128 MG/DL (ref 68–126)
FASTING PATIENT GLUCOSE ANSWER: YES
FASTING PATIENT LIPID ANSWER: YES
GLUCOSE BLD-MCNC: 120 MG/DL (ref 70–99)
HBA1C MFR BLD: 6.1 % (ref ?–5.7)
HDLC SERPL-MCNC: 46 MG/DL (ref 40–59)
LDLC SERPL CALC-MCNC: 134 MG/DL (ref ?–100)
NONHDLC SERPL-MCNC: 149 MG/DL (ref ?–130)
TRIGL SERPL-MCNC: 81 MG/DL (ref 30–149)
VLDLC SERPL CALC-MCNC: 15 MG/DL (ref 0–30)

## 2025-07-29 PROCEDURE — 83036 HEMOGLOBIN GLYCOSYLATED A1C: CPT

## 2025-07-29 PROCEDURE — 82947 ASSAY GLUCOSE BLOOD QUANT: CPT

## 2025-07-29 PROCEDURE — 80061 LIPID PANEL: CPT

## 2025-07-29 PROCEDURE — 36415 COLL VENOUS BLD VENIPUNCTURE: CPT

## 2025-08-11 ENCOUNTER — TELEPHONE (OUTPATIENT)
Facility: CLINIC | Age: 65
End: 2025-08-11

## 2025-08-12 ENCOUNTER — HOSPITAL ENCOUNTER (OUTPATIENT)
Dept: CT IMAGING | Facility: HOSPITAL | Age: 65
Discharge: HOME | End: 2025-08-12
Attending: NURSE PRACTITIONER

## 2025-08-12 ENCOUNTER — OFFICE VISIT (OUTPATIENT)
Facility: CLINIC | Age: 65
End: 2025-08-12

## 2025-08-12 VITALS
WEIGHT: 210 LBS | HEIGHT: 70 IN | DIASTOLIC BLOOD PRESSURE: 64 MMHG | HEART RATE: 75 BPM | SYSTOLIC BLOOD PRESSURE: 102 MMHG | BODY MASS INDEX: 30.06 KG/M2

## 2025-08-12 DIAGNOSIS — R11.2 NAUSEA AND VOMITING, UNSPECIFIED VOMITING TYPE: ICD-10-CM

## 2025-08-12 DIAGNOSIS — R63.4 WEIGHT LOSS: ICD-10-CM

## 2025-08-12 DIAGNOSIS — Z87.19 HISTORY OF DIVERTICULITIS: ICD-10-CM

## 2025-08-12 DIAGNOSIS — R10.30 LOWER ABDOMINAL PAIN: Primary | ICD-10-CM

## 2025-08-12 DIAGNOSIS — R10.30 LOWER ABDOMINAL PAIN: ICD-10-CM

## 2025-08-12 LAB
CREAT BLD-MCNC: 1.1 MG/DL (ref 0.7–1.3)
EGFRCR SERPLBLD CKD-EPI 2021: 75 ML/MIN/1.73M2 (ref 60–?)

## 2025-08-12 PROCEDURE — 3078F DIAST BP <80 MM HG: CPT | Performed by: NURSE PRACTITIONER

## 2025-08-12 PROCEDURE — 82565 ASSAY OF CREATININE: CPT

## 2025-08-12 PROCEDURE — 74177 CT ABD & PELVIS W/CONTRAST: CPT | Performed by: NURSE PRACTITIONER

## 2025-08-12 PROCEDURE — 3074F SYST BP LT 130 MM HG: CPT | Performed by: NURSE PRACTITIONER

## 2025-08-12 PROCEDURE — 99204 OFFICE O/P NEW MOD 45 MIN: CPT | Performed by: NURSE PRACTITIONER

## 2025-08-12 PROCEDURE — 3008F BODY MASS INDEX DOCD: CPT | Performed by: NURSE PRACTITIONER

## 2025-08-13 ENCOUNTER — TELEPHONE (OUTPATIENT)
Facility: CLINIC | Age: 65
End: 2025-08-13

## 2025-08-13 DIAGNOSIS — K86.9 PANCREATIC LESION (HCC): ICD-10-CM

## 2025-08-13 DIAGNOSIS — R93.5 ABNORMAL CT OF THE ABDOMEN: Primary | ICD-10-CM

## 2025-08-18 ENCOUNTER — HOSPITAL ENCOUNTER (OUTPATIENT)
Facility: HOSPITAL | Age: 65
Setting detail: HOSPITAL OUTPATIENT SURGERY
Discharge: HOME | End: 2025-08-18
Attending: INTERNAL MEDICINE | Admitting: INTERNAL MEDICINE

## 2025-08-18 ENCOUNTER — ANESTHESIA (OUTPATIENT)
Dept: ENDOSCOPY | Facility: HOSPITAL | Age: 65
End: 2025-08-18

## 2025-08-18 ENCOUNTER — ANESTHESIA EVENT (OUTPATIENT)
Dept: ENDOSCOPY | Facility: HOSPITAL | Age: 65
End: 2025-08-18

## 2025-08-18 VITALS
BODY MASS INDEX: 30.06 KG/M2 | RESPIRATION RATE: 15 BRPM | WEIGHT: 210 LBS | SYSTOLIC BLOOD PRESSURE: 143 MMHG | DIASTOLIC BLOOD PRESSURE: 84 MMHG | HEART RATE: 47 BPM | OXYGEN SATURATION: 100 % | HEIGHT: 70 IN

## 2025-08-18 DIAGNOSIS — K86.9 PANCREATIC LESION (HCC): ICD-10-CM

## 2025-08-18 DIAGNOSIS — C25.0 MALIGNANT NEOPLASM OF HEAD OF PANCREAS (HCC): Primary | ICD-10-CM

## 2025-08-18 DIAGNOSIS — R93.5 ABNORMAL CT OF THE ABDOMEN: ICD-10-CM

## 2025-08-18 PROCEDURE — 43239 EGD BIOPSY SINGLE/MULTIPLE: CPT | Performed by: INTERNAL MEDICINE

## 2025-08-18 PROCEDURE — 43238 EGD US FINE NEEDLE BX/ASPIR: CPT | Performed by: INTERNAL MEDICINE

## 2025-08-18 RX ORDER — LIDOCAINE HYDROCHLORIDE 10 MG/ML
INJECTION, SOLUTION EPIDURAL; INFILTRATION; INTRACAUDAL; PERINEURAL AS NEEDED
Status: DISCONTINUED | OUTPATIENT
Start: 2025-08-18 | End: 2025-08-18 | Stop reason: SURG

## 2025-08-18 RX ORDER — SODIUM CHLORIDE, SODIUM LACTATE, POTASSIUM CHLORIDE, CALCIUM CHLORIDE 600; 310; 30; 20 MG/100ML; MG/100ML; MG/100ML; MG/100ML
INJECTION, SOLUTION INTRAVENOUS CONTINUOUS
Status: DISCONTINUED | OUTPATIENT
Start: 2025-08-18 | End: 2025-08-18

## 2025-08-18 RX ORDER — GLYCOPYRROLATE 0.2 MG/ML
INJECTION, SOLUTION INTRAMUSCULAR; INTRAVENOUS AS NEEDED
Status: DISCONTINUED | OUTPATIENT
Start: 2025-08-18 | End: 2025-08-18 | Stop reason: SURG

## 2025-08-18 RX ORDER — NALOXONE HYDROCHLORIDE 0.4 MG/ML
0.08 INJECTION, SOLUTION INTRAMUSCULAR; INTRAVENOUS; SUBCUTANEOUS ONCE AS NEEDED
Status: DISCONTINUED | OUTPATIENT
Start: 2025-08-18 | End: 2025-08-18

## 2025-08-18 RX ORDER — ACETAMINOPHEN 500 MG
500 TABLET ORAL EVERY 6 HOURS PRN
COMMUNITY

## 2025-08-18 RX ADMIN — GLYCOPYRROLATE 0.2 MG: 0.2 INJECTION, SOLUTION INTRAMUSCULAR; INTRAVENOUS at 13:39:00

## 2025-08-18 RX ADMIN — LIDOCAINE HYDROCHLORIDE 20 MG: 10 INJECTION, SOLUTION EPIDURAL; INFILTRATION; INTRACAUDAL; PERINEURAL at 13:39:00

## 2025-08-20 ENCOUNTER — TELEPHONE (OUTPATIENT)
Facility: LOCATION | Age: 65
End: 2025-08-20

## 2025-08-22 ENCOUNTER — OFFICE VISIT (OUTPATIENT)
Facility: LOCATION | Age: 65
End: 2025-08-22
Attending: INTERNAL MEDICINE

## 2025-08-22 ENCOUNTER — TELEPHONE (OUTPATIENT)
Facility: CLINIC | Age: 65
End: 2025-08-22

## 2025-08-22 ENCOUNTER — DOCUMENTATION ONLY (OUTPATIENT)
Facility: LOCATION | Age: 65
End: 2025-08-22

## 2025-08-22 VITALS
RESPIRATION RATE: 18 BRPM | OXYGEN SATURATION: 96 % | HEIGHT: 70 IN | TEMPERATURE: 98 F | BODY MASS INDEX: 30.06 KG/M2 | HEART RATE: 65 BPM | WEIGHT: 210 LBS | DIASTOLIC BLOOD PRESSURE: 74 MMHG | SYSTOLIC BLOOD PRESSURE: 108 MMHG

## 2025-08-22 DIAGNOSIS — K29.70 HELICOBACTER PYLORI GASTRITIS: ICD-10-CM

## 2025-08-22 DIAGNOSIS — Z80.3 FAMILY HISTORY OF BREAST CANCER: ICD-10-CM

## 2025-08-22 DIAGNOSIS — C25.0 MALIGNANT NEOPLASM OF HEAD OF PANCREAS (HCC): Primary | ICD-10-CM

## 2025-08-22 DIAGNOSIS — B96.81 HELICOBACTER PYLORI GASTRITIS: ICD-10-CM

## 2025-08-22 DIAGNOSIS — A04.8 HELICOBACTER PYLORI (H. PYLORI) INFECTION: Primary | ICD-10-CM

## 2025-08-22 DIAGNOSIS — G89.3 CANCER RELATED PAIN: ICD-10-CM

## 2025-08-22 RX ORDER — HYDROCODONE BITARTRATE AND ACETAMINOPHEN 5; 325 MG/1; MG/1
1 TABLET ORAL EVERY 6 HOURS PRN
Qty: 60 TABLET | Refills: 0 | Status: SHIPPED | OUTPATIENT
Start: 2025-08-22

## 2025-08-22 RX ORDER — METRONIDAZOLE 500 MG/1
500 TABLET ORAL
Qty: 56 TABLET | Refills: 0 | Status: SHIPPED | OUTPATIENT
Start: 2025-08-22 | End: 2025-09-05

## 2025-08-22 RX ORDER — TETRACYCLINE HYDROCHLORIDE 500 MG/1
500 CAPSULE ORAL 4 TIMES DAILY
Qty: 56 CAPSULE | Refills: 0 | Status: SHIPPED | OUTPATIENT
Start: 2025-08-22 | End: 2025-09-05

## 2025-08-22 RX ORDER — BISMUTH SUBSALICYLATE 262 MG/1
525 TABLET, CHEWABLE ORAL
Qty: 112 TABLET | Refills: 0 | Status: SHIPPED | OUTPATIENT
Start: 2025-08-22 | End: 2025-09-05

## 2025-08-22 RX ORDER — PANTOPRAZOLE SODIUM 40 MG/1
40 TABLET, DELAYED RELEASE ORAL 2 TIMES DAILY
Qty: 28 TABLET | Refills: 0 | Status: SHIPPED | OUTPATIENT
Start: 2025-08-22 | End: 2025-09-05

## 2025-08-26 ENCOUNTER — OFFICE VISIT (OUTPATIENT)
Facility: CLINIC | Age: 65
End: 2025-08-26

## 2025-08-26 VITALS
DIASTOLIC BLOOD PRESSURE: 78 MMHG | HEART RATE: 70 BPM | OXYGEN SATURATION: 98 % | WEIGHT: 210 LBS | SYSTOLIC BLOOD PRESSURE: 119 MMHG | BODY MASS INDEX: 30 KG/M2 | RESPIRATION RATE: 16 BRPM

## 2025-08-26 DIAGNOSIS — C25.0 MALIGNANT NEOPLASM OF HEAD OF PANCREAS (HCC): Primary | ICD-10-CM

## 2025-08-27 ENCOUNTER — TELEPHONE (OUTPATIENT)
Facility: LOCATION | Age: 65
End: 2025-08-27

## (undated) DEVICE — Device

## (undated) DEVICE — KIT MFLD FOR SPEC COLL

## (undated) DEVICE — SYRINGE, LUER SLIP, STERILE, 60ML: Brand: MEDLINE

## (undated) DEVICE — BLOCK BITE LG LUMN 20X27MM GRN DISP

## (undated) DEVICE — BALLOON HEMOSTATIC EUS RADIAL

## (undated) DEVICE — TUBING SCT CLR 6FT .25IN MDVC

## (undated) DEVICE — MEDI-VAC NON-CONDUCTIVE SUCTION TUBING 6MM X 1.8M (6FT.) L: Brand: CARDINAL HEALTH

## (undated) DEVICE — Device: Brand: DUAL NARE NASAL CANNULAE FEMALE LUER CON 7FT O2 TUBE

## (undated) DEVICE — LASSO POLYPECTOMY SNARE: Brand: LASSO

## (undated) DEVICE — KIT CLEAN ENDOKIT 1.1OZ GOWNX2

## (undated) DEVICE — KIT ENDO ORCAPOD 160/180/190

## (undated) NOTE — LETTER
July 20, 2017     [address]      Dear Meaghan Corporal:    Below are the results from your recent visit:    Resulted Orders   COMP METABOLIC PANEL (14)   Result Value Ref Range    Glucose 112 (H) 70 - 99 mg/dL    Sod MCH 29.9 27.0 - 32.0 pg    MCHC 33.9 32.0 - 37.0 g/dl    RDW 13.5 11.0 - 15.0 %     140 - 400 K/UL    MPV 9.0 7.4 - 10.3 fL    Neutrophil % 67 %    Lymphocyte % 20 %    Monocyte % 9 %    Eosinophil % 3 %    Basophil % 1 %    Neutrophil Absolute 4.5

## (undated) NOTE — LETTER
August 27, 2018     [address] [place]:    Below are the results from your recent visit:    Resulted Orders   COMP METABOLIC PANEL (14)   Result Value Ref Range    Glucose 113 (H) 70 - 99 mg/dL    S HGB 15.1 13.5 - 17.5 g/dL    HCT 43.6 41.0 - 52.0 %    MCV 87.7 80.0 - 100.0 fL    MCH 30.3 27.0 - 32.0 pg    MCHC 34.5 32.0 - 37.0 g/dl    RDW 13.8 11.0 - 15.0 %     140 - 400 K/UL    MPV 9.1 7.4 - 10.3 fL    Neutrophil % 63 %    Lymphocyte % 23 %

## (undated) NOTE — LETTER
[facility]  Administration of Anesthesia  I Chapito Jackson agree to be cared for by a physician anesthesiologist alone and/or with a nurse anesthetist, who is specially trained to monitor me and give me medicine to put me to sleep or keep me comfortable during my procedure    I understand that my anesthesiologist and/or anesthetist is not an employee or agent of [facility] or [employer]. He or she works for [facility]    As the patient asking for anesthesia services, I agree to:  Allow the anesthesiologist (anesthesia doctor) to give me medicine and do additional procedures as necessary. Some examples are: Starting or using an “IV” to give me medicine, fluids or blood during my procedure, and having a breathing tube placed to help me breathe when I’m asleep (intubation). In the event that my heart stops working properly, I understand that my anesthesiologist will make every effort to sustain my life, unless otherwise directed by [facility] Do Not Resuscitate documents.  Tell my anesthesia doctor before my procedure:  If I am pregnant.  The last time that I ate or drank.  iii. All of the medicines I take (including prescriptions, herbal supplements, and pills I can buy without a prescription (including street drugs/illegal medications). Failure to inform my anesthesiologist about these medicines may increase my risk of anesthetic complications.  iv.If I am allergic to anything or have had a reaction to anesthesia before.  I understand how the anesthesia medicine will help me (benefits).  I understand that with any type of anesthesia medicine there are risks:  The most common risks are: nausea, vomiting, sore throat, muscle soreness, damage to my eyes, mouth, or teeth (from breathing tube placement).  Rare risks include: remembering what happened during my procedure, allergic reactions to medications, injury to my airway, heart, lungs, vision, nerves, or  muscles and in extremely rare instances death.  My doctor has explained to me other choices available to me for my care (alternatives).  Pregnant Patients (“epidural”):  I understand that the risks of having an epidural (medicine given into my back to help control pain during labor), include itching, low blood pressure, difficulty urinating, headache or slowing of the baby’s heart. Very rare risks include infection, bleeding, seizure, irregular heart rhythms and nerve injury.  Regional Anesthesia (“spinal”, “epidural”, & “nerve blocks”):  I understand that rare but potential complications include headache, bleeding, infection, seizure, irregular heart rhythms, and nerve injury.    _____________________________________________________________________________  Patient (or Representative) Signature/Relationship to Patient  Date   Time    _____________________________________________________________________________   Name (if used)    Language/Organization   Time    _____________________________________________________________________________  Nurse Anesthetist Signature     Date   Time  _____________________________________________________________________________  Anesthesiologist Signature     Date   Time  I have discussed the procedure and information above with the patient (or patient’s representative) and answered their questions. The patient or their representative has agreed to have anesthesia services.    _____________________________________________________________________________  Witness        Date   Time  I have verified that the signature is that of the patient or patient’s representative, and that it was signed before the procedure  Patient Name: Chapito Jackson     : 10/3/1960                 Printed: 2024 at 6:45 AM    Medical Record #: [id number]                                            Page 1 of 1  ----------ANESTHESIA CONSENT----------

## (undated) NOTE — LETTER
02/04/21        This document is to verify [place] had a TB skin test preformed    Below are the results from your recent visit:      TB skin test placed on 2/2/21  TB skin test read on 02/04/21  Result is negative.       Recent Results (from the pa

## (undated) NOTE — LETTER
[facility] [address], [place], [place]    Authorization for Surgical Operation and Procedure                               I hereby authorize Larry Hammond MD, my physician and his/her assistants (if applicable), which may include medical students, residents, and/or fellows, to perform the following surgical operation/ procedure and administer such anesthesia as may be determined necessary by my physician: Operation/Procedure name (s) COLONOSCOPY on Chapito Jackson   2.   I recognize that during the surgical operation/procedure, unforeseen conditions may necessitate additional or different procedures than those listed above.  I, therefore, further authorize and request that the above-named surgeon, assistants, or designees perform such procedures as are, in their judgment, necessary and desirable.    3.   My surgeon/physician has discussed prior to my surgery the potential benefits, risks and side effects of this procedure; the likelihood of achieving goals; and potential problems that might occur during recuperation.  They also discussed reasonable alternatives to the procedure, including risks, benefits, and side effects related to the alternatives and risks related to not receiving this procedure.  I have had all my questions answered and I acknowledge that no guarantee has been made as to the result that may be obtained.    4.   Should the need arise during my operation/procedure, which includes change of level of care prior to discharge, I also consent to the administration of blood and/or blood products.  Further, I understand that despite careful testing and screening of blood or blood products by collecting agencies, I may still be subject to ill effects as a result of receiving a blood transfusion and/or blood products.  The following are some, but not all, of the potential risks that can occur: fever and allergic reactions, hemolytic reactions, transmission of diseases such  as Hepatitis, AIDS and Cytomegalovirus (CMV) and fluid overload.  In the event that I wish to have an autologous transfusion of my own blood, or a directed donor transfusion, I will discuss this with my physician.  Check only if Refusing Blood or Blood Products  I understand refusal of blood or blood products as deemed necessary by my physician may have serious consequences to my condition to include possible death. I hereby assume responsibility for my refusal and release the hospital, its personnel, and my physicians from any responsibility for the consequences of my refusal.    o  Refuse   5.   I authorize the use of any specimen, organs, tissues, body parts or foreign objects that may be removed from my body during the operation/procedure for diagnosis, research or teaching purposes and their subsequent disposal by hospital authorities.  I also authorize the release of specimen test results and/or written reports to my treating physician on the hospital medical staff or other referring or consulting physicians involved in my care, at the discretion of the Pathologist or my treating physician.    6.   I consent to the photographing or videotaping of the operations or procedures to be performed, including appropriate portions of my body for medical, scientific, or educational purposes, provided my identity is not revealed by the pictures or by descriptive texts accompanying them.  If the procedure has been photographed/videotaped, the surgeon will obtain the original picture, image, videotape or CD.  The hospital will not be responsible for storage, release or maintenance of the picture, image, tape or CD.    7.   I consent to the presence of a  or observers in the operating room as deemed necessary by my physician or their designees.    8.   I recognize that in the event my procedure results in extended X-Ray/fluoroscopy time, I may develop a skin reaction.    9. If I have a Do Not Attempt  Resuscitation (DNAR) order in place, that status will be suspended while in the operating room, procedural suite, and during the recovery period unless otherwise explicitly stated by me (or a person authorized to consent on my behalf). The surgeon or my attending physician will determine when the applicable recovery period ends for purposes of reinstating the DNAR order.  10. Patients having a sterilization procedure: I understand that if the procedure is successful the results will be permanent and it will therefore be impossible for me to inseminate, conceive, or bear children.  I also understand that the procedure is intended to result in sterility, although the result has not been guaranteed.   11. I acknowledge that my physician has explained sedation/analgesia administration to me including the risk and benefits I consent to the administration of sedation/analgesia as may be necessary or desirable in the judgment of my physician.    I CERTIFY THAT I HAVE READ AND FULLY UNDERSTAND THE ABOVE CONSENT TO OPERATION and/or OTHER PROCEDURE.     ____________________________________  _________________________________        ______________________________  Signature of Patient    Signature of Responsible Person                Printed Name of Responsible Person                                      ____________________________________  _____________________________                ________________________________  Signature of Witness        Date  Time         Relationship to Patient    STATEMENT OF PHYSICIAN My signature below affirms that prior to the time of the procedure; I have explained to the patient and/or his/her legal representative, the risks and benefits involved in the proposed treatment and any reasonable alternative to the proposed treatment. I have also explained the risks and benefits involved in refusal of the proposed treatment and alternatives to the proposed treatment and have answered the patient's  questions. If I have a significant financial interest in a co-management agreement or a significant financial interest in any product or implant, or other significant relationship used in this procedure/surgery, I have disclosed this and had a discussion with my patient.     _____________________________________________________              _____________________________  (Signature of Physician)                                                                                         (Date)                                   (Time)  Patient Name: Chapito Jackson      : 10/3/1960      Printed: 2024     Medical Record #: [id number]                                      Page 1 of 1